# Patient Record
Sex: FEMALE | Employment: FULL TIME | URBAN - METROPOLITAN AREA
[De-identification: names, ages, dates, MRNs, and addresses within clinical notes are randomized per-mention and may not be internally consistent; named-entity substitution may affect disease eponyms.]

---

## 2020-03-12 ENCOUNTER — APPOINTMENT (OUTPATIENT)
Dept: INTERPRETER SERVICES | Facility: CLINIC | Age: 35
End: 2020-03-12
Payer: MEDICAID

## 2020-03-27 ENCOUNTER — APPOINTMENT (OUTPATIENT)
Dept: INTERPRETER SERVICES | Facility: CLINIC | Age: 35
End: 2020-03-27
Payer: MEDICAID

## 2020-03-30 ENCOUNTER — ANCILLARY ORDERS (OUTPATIENT)
Dept: OBGYN | Facility: CLINIC | Age: 35
End: 2020-03-30
Payer: MEDICAID

## 2020-03-30 ENCOUNTER — OFFICE VISIT (OUTPATIENT)
Dept: OBGYN | Facility: CLINIC | Age: 35
End: 2020-03-30
Attending: MIDWIFE
Payer: MEDICAID

## 2020-03-30 ENCOUNTER — ANCILLARY PROCEDURE (OUTPATIENT)
Dept: ULTRASOUND IMAGING | Facility: CLINIC | Age: 35
End: 2020-03-30
Attending: OBSTETRICS & GYNECOLOGY
Payer: MEDICAID

## 2020-03-30 VITALS
HEART RATE: 63 BPM | DIASTOLIC BLOOD PRESSURE: 65 MMHG | HEIGHT: 59 IN | WEIGHT: 185.3 LBS | BODY MASS INDEX: 37.36 KG/M2 | SYSTOLIC BLOOD PRESSURE: 111 MMHG

## 2020-03-30 DIAGNOSIS — E78.5 DYSLIPIDEMIA: ICD-10-CM

## 2020-03-30 DIAGNOSIS — O09.90 HIGH RISK PREGNANCY, ANTEPARTUM: ICD-10-CM

## 2020-03-30 DIAGNOSIS — O36.71X0 DELIVERY OF VIABLE FETUS DURING ABDOMINAL PREGNANCY IN FIRST TRIMESTER, SINGLE OR UNSPECIFIED FETUS: ICD-10-CM

## 2020-03-30 DIAGNOSIS — E11.29 TYPE 2 DIABETES MELLITUS WITH MICROALBUMINURIA, UNSPECIFIED WHETHER LONG TERM INSULIN USE: ICD-10-CM

## 2020-03-30 DIAGNOSIS — Z33.1 PREGNANT STATE, INCIDENTAL: ICD-10-CM

## 2020-03-30 DIAGNOSIS — Z98.891 PREVIOUS CESAREAN SECTION: ICD-10-CM

## 2020-03-30 DIAGNOSIS — O09.91 HRP (HIGH RISK PREGNANCY), FIRST TRIMESTER: Primary | ICD-10-CM

## 2020-03-30 DIAGNOSIS — R80.9 TYPE 2 DIABETES MELLITUS WITH MICROALBUMINURIA, UNSPECIFIED WHETHER LONG TERM INSULIN USE: ICD-10-CM

## 2020-03-30 DIAGNOSIS — Z23 NEED FOR IMMUNIZATION AGAINST INFLUENZA: ICD-10-CM

## 2020-03-30 PROBLEM — N92.6 IRREGULAR PERIODS: Status: ACTIVE | Noted: 2017-04-04

## 2020-03-30 LAB
ABO + RH BLD: NORMAL
ABO + RH BLD: NORMAL
BASOPHILS # BLD AUTO: 0 10E9/L (ref 0–0.2)
BASOPHILS NFR BLD AUTO: 0.2 %
BLD GP AB SCN SERPL QL: NORMAL
BLOOD BANK CMNT PATIENT-IMP: NORMAL
DIFFERENTIAL METHOD BLD: ABNORMAL
EOSINOPHIL # BLD AUTO: 0 10E9/L (ref 0–0.7)
EOSINOPHIL NFR BLD AUTO: 0.4 %
ERYTHROCYTE [DISTWIDTH] IN BLOOD BY AUTOMATED COUNT: 14.1 % (ref 10–15)
HBA1C MFR BLD: 6.2 % (ref 0–5.6)
HCT VFR BLD AUTO: 38.1 % (ref 35–47)
HGB BLD-MCNC: 12.5 G/DL (ref 11.7–15.7)
IMM GRANULOCYTES # BLD: 0 10E9/L (ref 0–0.4)
IMM GRANULOCYTES NFR BLD: 0.4 %
LYMPHOCYTES # BLD AUTO: 3.8 10E9/L (ref 0.8–5.3)
LYMPHOCYTES NFR BLD AUTO: 36.3 %
MCH RBC QN AUTO: 26.3 PG (ref 26.5–33)
MCHC RBC AUTO-ENTMCNC: 32.8 G/DL (ref 31.5–36.5)
MCV RBC AUTO: 80 FL (ref 78–100)
MONOCYTES # BLD AUTO: 0.7 10E9/L (ref 0–1.3)
MONOCYTES NFR BLD AUTO: 6.4 %
NEUTROPHILS # BLD AUTO: 6 10E9/L (ref 1.6–8.3)
NEUTROPHILS NFR BLD AUTO: 56.3 %
NRBC # BLD AUTO: 0 10*3/UL
NRBC BLD AUTO-RTO: 0 /100
PLATELET # BLD AUTO: 261 10E9/L (ref 150–450)
RBC # BLD AUTO: 4.75 10E12/L (ref 3.8–5.2)
SPECIMEN EXP DATE BLD: NORMAL
WBC # BLD AUTO: 10.6 10E9/L (ref 4–11)

## 2020-03-30 PROCEDURE — 86850 RBC ANTIBODY SCREEN: CPT | Performed by: ADVANCED PRACTICE MIDWIFE

## 2020-03-30 PROCEDURE — 86803 HEPATITIS C AB TEST: CPT | Performed by: ADVANCED PRACTICE MIDWIFE

## 2020-03-30 PROCEDURE — 86780 TREPONEMA PALLIDUM: CPT | Performed by: ADVANCED PRACTICE MIDWIFE

## 2020-03-30 PROCEDURE — 86901 BLOOD TYPING SEROLOGIC RH(D): CPT | Performed by: ADVANCED PRACTICE MIDWIFE

## 2020-03-30 PROCEDURE — 90686 IIV4 VACC NO PRSV 0.5 ML IM: CPT | Mod: ZF

## 2020-03-30 PROCEDURE — 36415 COLL VENOUS BLD VENIPUNCTURE: CPT | Performed by: ADVANCED PRACTICE MIDWIFE

## 2020-03-30 PROCEDURE — 86762 RUBELLA ANTIBODY: CPT | Performed by: ADVANCED PRACTICE MIDWIFE

## 2020-03-30 PROCEDURE — 86900 BLOOD TYPING SEROLOGIC ABO: CPT | Performed by: ADVANCED PRACTICE MIDWIFE

## 2020-03-30 PROCEDURE — 83036 HEMOGLOBIN GLYCOSYLATED A1C: CPT | Performed by: ADVANCED PRACTICE MIDWIFE

## 2020-03-30 PROCEDURE — G0499 HEPB SCREEN HIGH RISK INDIV: HCPCS | Performed by: ADVANCED PRACTICE MIDWIFE

## 2020-03-30 PROCEDURE — 86706 HEP B SURFACE ANTIBODY: CPT | Performed by: ADVANCED PRACTICE MIDWIFE

## 2020-03-30 PROCEDURE — 87389 HIV-1 AG W/HIV-1&-2 AB AG IA: CPT | Performed by: ADVANCED PRACTICE MIDWIFE

## 2020-03-30 PROCEDURE — 85025 COMPLETE CBC W/AUTO DIFF WBC: CPT | Performed by: ADVANCED PRACTICE MIDWIFE

## 2020-03-30 PROCEDURE — G0463 HOSPITAL OUTPT CLINIC VISIT: HCPCS | Mod: 25,ZF

## 2020-03-30 PROCEDURE — 76817 TRANSVAGINAL US OBSTETRIC: CPT

## 2020-03-30 PROCEDURE — 82306 VITAMIN D 25 HYDROXY: CPT | Performed by: ADVANCED PRACTICE MIDWIFE

## 2020-03-30 PROCEDURE — 87086 URINE CULTURE/COLONY COUNT: CPT | Performed by: ADVANCED PRACTICE MIDWIFE

## 2020-03-30 PROCEDURE — G0008 ADMIN INFLUENZA VIRUS VAC: HCPCS | Mod: ZF

## 2020-03-30 PROCEDURE — T1013 SIGN LANG/ORAL INTERPRETER: HCPCS | Mod: U3,ZF | Performed by: ADVANCED PRACTICE MIDWIFE

## 2020-03-30 PROCEDURE — 25000128 H RX IP 250 OP 636: Mod: ZF

## 2020-03-30 RX ORDER — GEMFIBROZIL 600 MG/1
600 TABLET, FILM COATED ORAL DAILY
COMMUNITY
Start: 2019-12-23 | End: 2020-04-10

## 2020-03-30 RX ORDER — PNV NO.95/FERROUS FUM/FOLIC AC 28MG-0.8MG
1 TABLET ORAL DAILY
COMMUNITY
End: 2020-08-26

## 2020-03-30 ASSESSMENT — PATIENT HEALTH QUESTIONNAIRE - PHQ9
5. POOR APPETITE OR OVEREATING: NOT AT ALL
SUM OF ALL RESPONSES TO PHQ QUESTIONS 1-9: 2

## 2020-03-30 ASSESSMENT — MIFFLIN-ST. JEOR: SCORE: 1451.15

## 2020-03-30 ASSESSMENT — PAIN SCALES - GENERAL: PAINLEVEL: NO PAIN (0)

## 2020-03-30 ASSESSMENT — ANXIETY QUESTIONNAIRES
GAD7 TOTAL SCORE: 1
7. FEELING AFRAID AS IF SOMETHING AWFUL MIGHT HAPPEN: NOT AT ALL
3. WORRYING TOO MUCH ABOUT DIFFERENT THINGS: NOT AT ALL
6. BECOMING EASILY ANNOYED OR IRRITABLE: SEVERAL DAYS
5. BEING SO RESTLESS THAT IT IS HARD TO SIT STILL: NOT AT ALL
1. FEELING NERVOUS, ANXIOUS, OR ON EDGE: NOT AT ALL
2. NOT BEING ABLE TO STOP OR CONTROL WORRYING: NOT AT ALL

## 2020-03-30 NOTE — PROGRESS NOTES
[facility] OB Intake note  Subjective   34 year old woman presents to clinic for initiation of OB care. Patient's last menstrual period was 2020.  at 7w3d by Estimated Date of Delivery: 2020 based on US confirms. Reviewed dating ultrasound. Pregnancy is unplanned but welcomed.      Symptoms since LMP include nausea. Patient has tried these relief measures: diet modification.    - Genetic/Infection questionnaire completed, risks include: Type 2 DM. Pt  does not have a recent known exposure to Parvo or CMV so IgG/IgM testing WILL NOT be ordered.   Have you traveled during the pregnancy?No  Have your sexual partner(s) travelled during the pregnancy?No      - Current Medications    Current Outpatient Medications   Medication Sig Dispense Refill     metFORMIN (GLUCOPHAGE) 1000 MG tablet Take 1,000 mg by mouth 2 times daily (with meals)       Prenatal Vit-Fe Fumarate-FA (PRENATAL VITAMIN) 27-0.8 MG TABS Take 1 tablet by mouth daily                   - Co-morbids  BMI 37, PCOS, Type 2 DM, Previous  section       - High Risk for Pre E-  Has Pre Gestational Diabetes (Type 1 or Type 2) so WILL start low dose aspirin (81mg) starting between 12 and 28 weeks to prevent early onset preeclampsia.        - The patient  does not have a history of spontaneous  birth so  WILL NOT consider progesterone starting at 16-20 weeks and/or serial transvaginal cervical length ultrasounds from 16-24 weeks.     -The patient does not have a history of immunosuppresion or HIV so Toxoplasma IgG/IgM WILL NOT be ordered.     PERSONAL/SOCIAL HISTORY    lives with their family.  Employment: Full time. 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    Diagnoses and all orders for this visit:    HRP (high risk pregnancy), first trimester  -     Hemoglobin A1c    Pregnant state, incidental  -     25- OH-Vitamin D  -     ABO/Rh Type and Screen  -     CBC with Platelets Differential  -     Hepatitis B Surface Antigen  -     Urine Culture Aerobic Bacterial  -     Treponema Abs w Reflex to RPR and Titer  -     Rubella Antibody IgG Quantitative  -     Cancel: Routine UA with Microscopic  -     HIV Antigen Antibody Combo  -     Hepatitis C antibody  -     Hepatitis B Surface Antibody    Type 2 diabetes mellitus with microalbuminuria, unspecified whether long term insulin use (H)  -     ENDOCRINOLOGY ADULT REFERRAL    Need for immunization against influenza  -     FLU VAC PRESRV FREE QUAD SPLIT VIR 3+YRS IM    Previous  section    Dyslipidemia        34 year old  7w3d weeks of pregnancy with VIKAS of 2020 by LMP of Patient's last menstrual period was 2020.. Ultrasound confirms.   Outpatient Encounter Medications as of 3/30/2020   Medication Sig Dispense Refill     metFORMIN (GLUCOPHAGE) 1000 MG tablet Take 1,000 mg by mouth 2 times daily (with meals)       Prenatal Vit-Fe Fumarate-FA (PRENATAL VITAMIN) 27-0.8 MG TABS Take 1 tablet by mouth daily              No facility-administered encounter medications on file as of 3/30/2020.       Orders Placed This Encounter   Procedures     FLU VAC PRESRV FREE QUAD SPLIT VIR 3+YRS IM     25- OH-Vitamin D     CBC with Platelets Differential     Hepatitis B Surface Antigen     Treponema Abs w Reflex to RPR and Titer     Rubella Antibody IgG Quantitative     HIV Antigen Antibody Combo     Hepatitis C antibody     Hepatitis B Surface Antibody     Hemoglobin A1c     ENDOCRINOLOGY ADULT REFERRAL     ABO/Rh Type and Screen                       - Oriented to Practice, types of care, and how to reach a  provider.  Pt prefers MD team  - Patient received 1st trimester new OB education packet complete with aide of [employer] booklet including information on genetic screening test options.  - Patient would like to disucss options further at new OB visit.  - Educational handout on the prevention of infections diseases during pregnancy provided.  - Patient was encouraged to start prenatal vitamins as tolerated.    - Patient was sent to lab for routine OB labs including- Hgb A1c.   - Reviewed recommendation for low dose aspirin daily to prevent pre eclampsia, pt agrees, follow up at NOB visit.   - Pregnancy concerns to be addressed by provider at new OB exam include: previous C/S x1 and Diabetes.    Pt to RTO for NOB visit in 4 weeks and prn if questions or concerns    DENNY Chacko CNM

## 2020-03-30 NOTE — LETTER
3/30/2020       RE: Tanisha Valenzuela  [address] [facility] [address]     Dear Colleague,    Thank you for referring your patient, Tanisha Valenzuela, to the [facility] at [facility]. Please see a copy of my visit note below.    [facility] OB Intake note  Subjective   34 year old woman presents to clinic for initiation of OB care. Patient's last menstrual period was 2020.  at 7w3d by Estimated Date of Delivery: 2020 based on US confirms. Reviewed dating ultrasound. Pregnancy is unplanned but welcomed.      Symptoms since LMP include nausea. Patient has tried these relief measures: diet modification.    - Genetic/Infection questionnaire completed, risks include: Type 2 DM. Pt  does not have a recent known exposure to Parvo or CMV so IgG/IgM testing WILL NOT be ordered.   Have you traveled during the pregnancy?No  Have your sexual partner(s) travelled during the pregnancy?No      - Current Medications    Current Outpatient Medications   Medication Sig Dispense Refill     metFORMIN (GLUCOPHAGE) 1000 MG tablet Take 1,000 mg by mouth 2 times daily (with meals)       Prenatal Vit-Fe Fumarate-FA (PRENATAL VITAMIN) 27-0.8 MG TABS Take 1 tablet by mouth daily                   - Co-morbids  BMI 37, PCOS, Type 2 DM, Previous  section       - High Risk for Pre E-  Has Pre Gestational Diabetes (Type 1 or Type 2) so WILL start low dose aspirin (81mg) starting between 12 and 28 weeks to prevent early onset preeclampsia.        - The patient  does not have a history of spontaneous  birth so  WILL NOT consider progesterone starting at 16-20 weeks and/or serial transvaginal cervical length ultrasounds from 16-24 weeks.     -The patient does not have a history of immunosuppresion or HIV so Toxoplasma IgG/IgM WILL NOT be ordered.     PERSONAL/SOCIAL HISTORY    lives with their family.  Employment: Full time. 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    Diagnoses and all orders for this visit:    HRP (high risk pregnancy), first trimester  -     Hemoglobin A1c    Pregnant state, incidental  -     25- OH-Vitamin D  -     ABO/Rh Type and Screen  -     CBC with Platelets Differential  -     Hepatitis B Surface Antigen  -     Urine Culture Aerobic Bacterial  -     Treponema Abs w Reflex to RPR and Titer  -     Rubella Antibody IgG Quantitative  -     Cancel: Routine UA with Microscopic  -     HIV Antigen Antibody Combo  -     Hepatitis C antibody  -     Hepatitis B Surface Antibody    Type 2 diabetes mellitus with microalbuminuria, unspecified whether long term insulin use (H)  -     ENDOCRINOLOGY ADULT REFERRAL    Need for immunization against influenza  -     FLU VAC PRESRV FREE QUAD SPLIT VIR 3+YRS IM    Previous  section    Dyslipidemia        34 year old  7w3d weeks of pregnancy with VIKAS of 2020 by LMP of Patient's last menstrual period was 2020.. Ultrasound confirms.   Outpatient Encounter Medications as of 3/30/2020   Medication Sig Dispense Refill     metFORMIN (GLUCOPHAGE) 1000 MG tablet Take 1,000 mg by mouth 2 times daily (with meals)       Prenatal Vit-Fe Fumarate-FA (PRENATAL VITAMIN) 27-0.8 MG TABS Take 1 tablet by mouth daily              No facility-administered encounter medications on file as of 3/30/2020.       Orders Placed This Encounter   Procedures     FLU VAC PRESRV FREE QUAD SPLIT VIR 3+YRS IM     25- OH-Vitamin D     CBC with Platelets Differential     Hepatitis B Surface Antigen     Treponema Abs w Reflex to RPR  and Titer     Rubella Antibody IgG Quantitative     HIV Antigen Antibody Combo     Hepatitis C antibody     Hepatitis B Surface Antibody     Hemoglobin A1c     ENDOCRINOLOGY ADULT REFERRAL     ABO/Rh Type and Screen                       - Oriented to Practice, types of care, and how to reach a provider.  Pt prefers MD team  - Patient received 1st trimester new OB education packet complete with aide of [employer] booklet including information on genetic screening test options.  - Patient would like to disucss options further at new OB visit.  - Educational handout on the prevention of infections diseases during pregnancy provided.  - Patient was encouraged to start prenatal vitamins as tolerated.    - Patient was sent to lab for routine OB labs including- Hgb A1c.   - Reviewed recommendation for low dose aspirin daily to prevent pre eclampsia, pt agrees, follow up at NOB visit.   - Pregnancy concerns to be addressed by provider at new OB exam include: previous C/S x1 and Diabetes.    Pt to RTO for NOB visit in 4 weeks and prn if questions or concerns    DENNY Chacko CNM

## 2020-03-31 ENCOUNTER — TELEPHONE (OUTPATIENT)
Dept: ENDOCRINOLOGY | Facility: CLINIC | Age: 35
End: 2020-03-31

## 2020-03-31 LAB
BACTERIA SPEC CULT: NORMAL
DEPRECATED CALCIDIOL+CALCIFEROL SERPL-MC: 19 UG/L (ref 20–75)
HBV SURFACE AB SERPL IA-ACNC: 192.38 M[IU]/ML
HBV SURFACE AG SERPL QL IA: NONREACTIVE
HCV AB SERPL QL IA: NONREACTIVE
HIV 1+2 AB+HIV1 P24 AG SERPL QL IA: NONREACTIVE
Lab: NORMAL
RUBV IGG SERPL IA-ACNC: 82 IU/ML
SPECIMEN SOURCE: NORMAL
T PALLIDUM AB SER QL: NONREACTIVE

## 2020-03-31 ASSESSMENT — ANXIETY QUESTIONNAIRES: GAD7 TOTAL SCORE: 1

## 2020-03-31 NOTE — TELEPHONE ENCOUNTER
Please schedule telephone/ video visit for GDM in any 1 hour space this week. On call if not available

## 2020-03-31 NOTE — TELEPHONE ENCOUNTER
[facility] Call Center    Phone Message    May a detailed message be left on voicemail: yes     Reason for Call: Other: Patient is being referred for Type 2 Diabetes, she has  Pre Gestational Diabetes. Please review and call patient to schedule.      Action Taken: Other: Diabetes and Endocrinology    Travel Screening: Not Applicable

## 2020-04-03 DIAGNOSIS — R79.89 LOW VITAMIN D LEVEL: Primary | ICD-10-CM

## 2020-04-03 RX ORDER — CHOLECALCIFEROL (VITAMIN D3) 50 MCG
2 TABLET ORAL DAILY
Qty: 180 TABLET | Refills: 3 | Status: SHIPPED | OUTPATIENT
Start: 2020-04-03

## 2020-04-08 NOTE — TELEPHONE ENCOUNTER
RECORDS RECEIVED FROM: Internal/Care Everywhere   DATE RECEIVED: 4-10-20   NOTES (FOR ALL VISITS) STATUS DETAILS   OFFICE NOTES from referring provider Internal 3-30-20 Theresa Hernandez CNM   OFFICE NOTES from other specialist Care Everywhere Had DM2 followed by GP   12-23-19 9-20-19 9-6-19   ED NOTES N/A    OPERATIVE REPORT  (thyroid, pituitary, adrenal, parathyroid) N/A    MEDICATION LIST Internal    IMAGING      DEXASCAN N/A    MRI (BRAIN) N/A    XR (Chest) N/A    CT (HEAD/NECK/CHEST/ABDOMEN) N/A    NUCLEAR  N/A    ULTRASOUND (HEAD/NECK) N/A    LABS     DIABETES: HBGA1C, CREATININE, FASTING LIPIDS, MICROALBUMIN URINE, POTASSIUM, TSH, T4    THYROID: TSH, T4, CBC, THYRODLONULIN, TOTAL T3, FREE T4, CALCITONIN, CEA Internal   3-30-20

## 2020-04-09 ENCOUNTER — DOCUMENTATION ONLY (OUTPATIENT)
Dept: CARE COORDINATION | Facility: CLINIC | Age: 35
End: 2020-04-09

## 2020-04-09 NOTE — PROGRESS NOTES
"Tanisha Valenzuela is a 34 year old female who is being evaluated via a billable telephone visit.      The patient has been notified of following:     \"This telephone visit will be conducted via a call between you and your physician/provider. We have found that certain health care needs can be provided without the need for a physical exam.  This service lets us provide the care you need with a short phone conversation.  If a prescription is necessary we can send it directly to your pharmacy.  If lab work is needed we can place an order for that and you can then stop by our lab to have the test done at a later time.    Telephone visits are billed at different rates depending on your insurance coverage. During this emergency period, for some insurers they may be billed the same as an in-person visit.  Please reach out to your insurance provider with any questions.    If during the course of the call the physician/provider feels a telephone visit is not appropriate, you will not be charged for this service.\"    Patient has given verbal consent for Telephone visit?  Yes    How would you like to obtain your AVS? Mail a copy    Tanisha Valenzuela complains of    Chief Complaint   Patient presents with     New Patient     GDM       I have reviewed and updated the patient's Past Medical History, Social History, Family History and Medication List.    SPOKE TO PT 4/9 @12:12PM PT DOES NOT CHECK GLUCOSE LAST TIME SHE CHECKED WAS IN December 2019.-JM  "

## 2020-04-10 ENCOUNTER — VIRTUAL VISIT (OUTPATIENT)
Dept: ENDOCRINOLOGY | Facility: CLINIC | Age: 35
End: 2020-04-10
Payer: MEDICAID

## 2020-04-10 ENCOUNTER — APPOINTMENT (OUTPATIENT)
Dept: INTERPRETER SERVICES | Facility: CLINIC | Age: 35
End: 2020-04-10
Payer: MEDICAID

## 2020-04-10 ENCOUNTER — PRE VISIT (OUTPATIENT)
Dept: ENDOCRINOLOGY | Facility: CLINIC | Age: 35
End: 2020-04-10

## 2020-04-10 DIAGNOSIS — E11.29 TYPE 2 DIABETES MELLITUS WITH MICROALBUMINURIA, UNSPECIFIED WHETHER LONG TERM INSULIN USE: Primary | ICD-10-CM

## 2020-04-10 DIAGNOSIS — R80.9 TYPE 2 DIABETES MELLITUS WITH MICROALBUMINURIA, UNSPECIFIED WHETHER LONG TERM INSULIN USE: Primary | ICD-10-CM

## 2020-04-10 NOTE — PATIENT INSTRUCTIONS
Patient to check her blood sugar before meals, and after meals. Goal blood sugar 60-90 fasting, one hour postprandial less than 140, two-hour postprandial less than 120, generally after eating should be less than 130    Less carb intake (rice/tortillas)

## 2020-04-10 NOTE — PROGRESS NOTES
Due to the COVID 19 pandemic this visit was converted to a telephone/virtual  visit in order to help prevent spread of infection in this high risk patient and the general population .      Start time: 1230, stop time: 110: Total time, 40 minutes.   required.    Tanisha Valenzuela is a 34 year old female who is being evaluated via a billable telephone visit.      HPI  #1 type 2 diabetes  Per patient report, she is had diabetes since approximately 2017.  She has been on metformin since diagnosis.  She has been very intermittent with checking her blood sugar.  September 2019 hemoglobin A1c of 7.1 in March 2020 hemoglobin A1c of 6.2.  Currently she is on metformin at 1000 mg twice daily.    Per patient report, she just recently received a meter and understands she that she has to check checked before eating and after meals every day.  She had previously been on gemfibrozil but this was stopped in January 2020.  She has never been on insulin.  There is an extremely strong family history of diabetes.  She has not met with diabetes education.      #2 pregnancy  Patient is currently pregnant.  She is approximately 7 weeks.  She anticipates a due date in November 2020. She reports a previous pregnancy (around 2004) for which she did not have gestational diabetes.      Past Medical History  Past Medical History:   Diagnosis Date     BMI 37.0-37.9, adult      Hyperlipidemia      PCOS (polycystic ovarian syndrome)      Type 2 diabetes mellitus without complication, with long-term current use of insulin (H)        Allergies  No Known Allergies  Medications  Current Outpatient Medications   Medication Sig Dispense Refill     metFORMIN (GLUCOPHAGE) 1000 MG tablet Take 1,000 mg by mouth 2 times daily (with meals)       Prenatal Vit-Fe Fumarate-FA (PRENATAL VITAMIN) 27-0.8 MG TABS Take 1 tablet by mouth daily       vitamin D3 (CHOLECALCIFEROL) 2000 units (50 mcg) tablet Take 2 tablets (4,000 Units) by mouth daily Take  two tablets daily. 180 tablet 3     gemfibrozil (LOPID) 600 MG tablet Take 600 mg by mouth daily       Family History  family history includes Diabetes in her maternal grandmother and mother.  Social History  Social History     Socioeconomic History     Marital status:      Spouse name: Zander Gil     Number of children: 1     Years of education: Not on file     Highest education level: Not on file   Occupational History     Not on file   Social Needs     Financial resource strain: Not on file     Food insecurity     Worry: Not on file     Inability: Not on file     Transportation needs     Medical: Not on file     Non-medical: Not on file   Tobacco Use     Smoking status: Former Smoker     Smokeless tobacco: Never Used   Substance and Sexual Activity     Alcohol use: Not Currently     Drug use: Never     Sexual activity: Yes     Partners: Male   Lifestyle     Physical activity     Days per week: Not on file     Minutes per session: Not on file     Stress: Not on file   Relationships     Social connections     Talks on phone: Not on file     Gets together: Not on file     Attends [employer] service: Not on file     Active member of club or organization: Not on file     Attends meetings of clubs or organizations: Not on file     Relationship status: Not on file     Intimate partner violence     Fear of current or ex partner: Not on file     Emotionally abused: Not on file     Physically abused: Not on file     Forced sexual activity: Not on file   Other Topics Concern     Not on file   Social History Narrative    How much exercise per week? Daily activites    How much calcium per day? In prenatals        How much caffeine per day? Not every day  23 week    How much vitamin D per day? In prentals    Do you/your family wear seatbelts?  Yes    Do you/your family use safety helmets? Yes    Do you/your family use sunscreen? Yes    Do you/your family keep firearms in the home? No    Do you/your family have a smoke  detector(s)? Yes        Reviewed [facility]-           ROS  Per HPI, otherwise review of symptoms was unremarkable      Physical Exam  LMP 02/01/2020   There is no height or weight on file to calculate BMI.    Vital signs and physical exam not available.  However the patient reports feeling well.  Psych: Alert and oriented times 3; coherent speech, normal   rate and volume, able to articulate logical thoughts, able   to abstract reason, no tangential thoughts, no hallucinations   or delusions        RESULTS  Office Visit on 03/30/2020   Component Date Value Ref Range Status     Vitamin D Deficiency screening 03/30/2020 19* 20 - 75 ug/L Final    Comment: Season, race, dietary intake, and treatment affect the concentration of   25-hydroxy-Vitamin D. Values may decrease during winter months and increase   during summer months. Values 20-29 ug/L may indicate Vitamin D insufficiency   and values <20 ug/L may indicate Vitamin D deficiency.  Vitamin D determination is routinely performed by an immunoassay specific for   25 hydroxyvitamin D3.  If an individual is on vitamin D2 (ergocalciferol)   supplementation, please specify 25 OH vitamin D2 and D3 level determination by   LCMSMS test [id number].       ABO 03/30/2020 A   Final     RH(D) 03/30/2020 Pos   Final     Antibody Screen 03/30/2020 Neg   Final     Test Valid Only At 03/30/2020 [facility]      Final     Specimen Expires 03/30/2020 04/02/2020   Final     WBC 03/30/2020 10.6  4.0 - 11.0 10e9/L Final     RBC Count 03/30/2020 4.75  3.8 - 5.2 10e12/L Final     Hemoglobin 03/30/2020 12.5  11.7 - 15.7 g/dL Final     Hematocrit 03/30/2020 38.1  35.0 - 47.0 % Final     MCV 03/30/2020 80  78 - 100 fl Final     MCH 03/30/2020 26.3* 26.5 - 33.0 pg Final     MCHC 03/30/2020 32.8  31.5 - 36.5 g/dL Final     RDW 03/30/2020 14.1  10.0 - 15.0 % Final     Platelet Count 03/30/2020 261  150 - 450 10e9/L Final     Diff Method  03/30/2020 Automated Method   Final     % Neutrophils 03/30/2020 56.3  % Final     % Lymphocytes 03/30/2020 36.3  % Final     % Monocytes 03/30/2020 6.4  % Final     % Eosinophils 03/30/2020 0.4  % Final     % Basophils 03/30/2020 0.2  % Final     % Immature Granulocytes 03/30/2020 0.4  % Final     Nucleated RBCs 03/30/2020 0  0 /100 Final     Absolute Neutrophil 03/30/2020 6.0  1.6 - 8.3 10e9/L Final     Absolute Lymphocytes 03/30/2020 3.8  0.8 - 5.3 10e9/L Final     Absolute Monocytes 03/30/2020 0.7  0.0 - 1.3 10e9/L Final     Absolute Eosinophils 03/30/2020 0.0  0.0 - 0.7 10e9/L Final     Absolute Basophils 03/30/2020 0.0  0.0 - 0.2 10e9/L Final     Abs Immature Granulocytes 03/30/2020 0.0  0 - 0.4 10e9/L Final     Absolute Nucleated RBC 03/30/2020 0.0   Final     Hep B Surface Agn 03/30/2020 Nonreactive  NR^Nonreactive Final     Specimen Description 03/30/2020 Midstream Urine   Final     Special Requests 03/30/2020 Specimen received in preservative   Final     Culture Micro 03/30/2020    Final                    Value:10,000 to 50,000 colonies/mL  mixed urogenital berenice  Susceptibility testing not routinely done       Treponema Antibodies 03/30/2020 Nonreactive  NR^Nonreactive Final    Comment: Methodology Change: Test performed on the [employer] Liaison XL by Treponema   pallidum Total Antibodies Assay as of 3.17.2020.       Rubella Antibody IgG Quantitative 03/30/2020 82  IU/mL Final    Comment: Positive.  Suggests previous exposure or immunization and probable immunity  Reference Range:    Unvaccinated Negative 0-7 IU/mL  Vaccinated or previous exposure Positive 10 IU/ml or greater       HIV Antigen Antibody Combo 03/30/2020 Nonreactive  NR^Nonreactive     Final    HIV-1 p24 Ag & HIV-1/HIV-2 Ab Not Detected     Hepatitis C Antibody 03/30/2020 Nonreactive  NR^Nonreactive Final    Comment: Assay performance characteristics have not been established for newborns,   infants, and children       Hepatitis B Surface  Antibody 03/30/2020 192.38* <8.00 m[IU]/mL Final    Comment: Reactive, Patient is considered to be immune to infection with hepatitis B   when the value is greater than or equal to 12.0 m[IU]/mL.       Hemoglobin A1C 03/30/2020 6.2* 0 - 5.6 % Final    Comment: Normal <5.7% Prediabetes 5.7-6.4%  Diabetes 6.5% or higher - adopted from ADA   consensus guidelines.       ASSESSMENT:    #1 Type 2 diabetes  Extensive discussion with patient.  She had pre-existing diabetes and then became pregnant.  Currently on metformin at 1000 milligrams twice daily.  I talked with her about the possibility of either continuing with the Metformin or considering insulin and tapering off the metformin.  I discussed with the patient that metformin was probably safe in pregnancy whereas insulin has an established safety record in pregnancy.  The patient verbalized that she would like to continue with metformin at this time despite the established safety record with insulin.    I discussed with patient and her goal blood sugars with pregnancy which include fasting blood sugar roughly 60-90 and postprandial blood sugar less than 130.  She will check 4 times per day.  If she is unable to comply with this program, I may consider a CGM for the patient.  The patient understands that if her blood sugars are not within these goals, that she will be started on insulin and that she will need diabetes education at that time.  The patient will work on reducing her carbohydrate intake in the interim.    The patient has deferred on dietitian referral at this time.  She may need diabetes education in the future if she requires insulin for treatment of her blood sugars.    The patient also understands the importance of close follow-up.  We will call the patient in 1 week as a telephone visit to monitor her blood sugar.  She is very particular about the times of contact (either at noon or after 330) and will need a  at that time.  The  "patient understands that she needs to check her blood sugar which we will follow-up at that time and that over pregnancy, her glycemic control may worsen and that insulin may be a distinct possibility.  However again the patient verbalized understanding and would like to remain on metformin at this time.  Given that her recent hemoglobin A1c was 6.2 in March 2020, this is reassuring.    #2 pregnancy  Continue with OB follow-up.    #3 history of vitamin D deficiency  Patient currently on vitamin D at 4000 IU daily.    Start time: 1230, stop time: 110: Total time, 40 minutes.   required.    The patient has been notified of following:     \"This telephone visit will be conducted via a call between you and your physician/provider. We have found that certain health care needs can be provided without the need for a physical exam.  This service lets us provide the care you need with a short phone conversation.  If a prescription is necessary we can send it directly to your pharmacy.  If lab work is needed we can place an order for that and you can then stop by our lab to have the test done at a later time.    Telephone visits are billed at different rates depending on your insurance coverage. During this emergency period, for some insurers they may be billed the same as an in-person visit.  Please reach out to your insurance provider with any questions.    If during the course of the call the physician/provider feels a telephone visit is not appropriate, you will not be charged for this service.\"    Patient has given verbal consent for Telephone visit? Yes    "

## 2020-04-20 ENCOUNTER — APPOINTMENT (OUTPATIENT)
Dept: INTERPRETER SERVICES | Facility: CLINIC | Age: 35
End: 2020-04-20
Payer: MEDICAID

## 2020-04-20 NOTE — PROGRESS NOTES
"Tanisha Valenzuela is a 34 year old female who is being evaluated via a billable video visit.      The patient has been notified of following:     \"This video visit will be conducted via a call between you and your physician/provider. We have found that certain health care needs can be provided without the need for an in-person physical exam.  This service lets us provide the care you need with a video conversation.  If a prescription is necessary we can send it directly to your pharmacy.  If lab work is needed we can place an order for that and you can then stop by our lab to have the test done at a later time.    Video visits are billed at different rates depending on your insurance coverage.  Please reach out to your insurance provider with any questions.    If during the course of the call the physician/provider feels a video visit is not appropriate, you will not be charged for this service.\"    Patient has given verbal consent for Video visit? Yes    How would you like to obtain your AVS? Mail a copy    Patient would like the video invitation sent by: Text to cell phone: [phone]      Video Start Time: 4:50      Video-Visit Details    Type of service:  Video Visit    Video End Time (time video stopped):5:20    Originating Location (pt. Location): Home    Distant Location (provider location):  [facility]     Mode of Communication:  Doximity      Due to the COVID 19 pandemic this visit was converted to a telephone/virtual  visit in order to help prevent spread of infection in this high risk patient and the general population .       Total time, 30 minutes.     Tanisha Valenzuela is a 34 year old female who is being evaluated via a billable telephone visit.          HPI  #1 type 2 diabetes  Per patient report, she is had diabetes since approximately 2017.  She has been on metformin since diagnosis.  She has been very intermittent with checking her blood sugar.  September 2019 hemoglobin A1c " of 7.1 in March 2020 hemoglobin A1c of 6.2.  Currently she is on metformin at 1000 mg twice daily.    Per patient report, she just recently received a meter and understands she that she has to check checked before eating and after meals every day.  She had previously been on gemfibrozil but this was stopped in January 2020.  She has never been on insulin.  There is an strong family history of diabetes.  She has not met with diabetes education since pregnant but did to learn glucometer use.      #2 pregnancy  Patient is currently pregnant.  She is approximately 10 weeks.  She anticipates a due date in November 2020. She reports a previous pregnancy (around 2004) for which she did not have gestational diabetes.    She reports that she feels well.  She is avoiding sugar beverages, eating more fruits and vegetables.  Less rice and tortilla.  No bread.   She is avoiding eating in the evening after dinner.   She is taking Metformin.  Some nausea and eating little in the mornings.    She continues working and active there.  Walking most days as well.      She is taking Vitamin D and PNV.      Glucometer data:  106 today after lunch  98 fasting this morning.         Past Medical History  Past Medical History:   Diagnosis Date     BMI 37.0-37.9, adult      Hyperlipidemia      PCOS (polycystic ovarian syndrome)      Type 2 diabetes mellitus without complication, with long-term current use of insulin (H)        Allergies  No Known Allergies  Medications  Current Outpatient Medications   Medication Sig Dispense Refill     metFORMIN (GLUCOPHAGE) 1000 MG tablet Take 1 tablet (1,000 mg) by mouth 2 times daily (with meals) 90 tablet 3     Prenatal Vit-Fe Fumarate-FA (PRENATAL VITAMIN) 27-0.8 MG TABS Take 1 tablet by mouth daily       vitamin D3 (CHOLECALCIFEROL) 2000 units (50 mcg) tablet Take 2 tablets (4,000 Units) by mouth daily Take two tablets daily. 180 tablet 3     Family History  family history includes Diabetes in her  "maternal grandmother and mother.  Social History    ROS  Per HPI, otherwise review of symptoms was unremarkable      Physical Exam    PHONE VISIT    Tanisha is alerted and oriented.  Mood is \"good,\" affect is congruent.  Thoughtful form and content are fluid and coherent.  No signs of distress are appreciated.      Wt Readings from Last 10 Encounters:   20 84.1 kg (185 lb 4.8 oz)               RESULTS  Office Visit on 2020   Component Date Value Ref Range Status     Vitamin D Deficiency screening 2020 19* 20 - 75 ug/L Final    Comment: Season, race, dietary intake, and treatment affect the concentration of   25-hydroxy-Vitamin D. Values may decrease during winter months and increase   during summer months. Values 20-29 ug/L may indicate Vitamin D insufficiency   and values <20 ug/L may indicate Vitamin D deficiency.  Vitamin D determination is routinely performed by an immunoassay specific for   25 hydroxyvitamin D3.  If an individual is on vitamin D2 (ergocalciferol)   supplementation, please specify 25 OH vitamin D2 and D3 level determination by   LCMSMS test [id number].       Lab Results   Component Value Date    A1C 6.2 2020       ASSESSMENT:    #1 Type 2 diabetes  33 yo F with   Estimated Date of Delivery: 2020 based on US.  Pregnancy is unplanned but welcomed.  Recent hemoglobin A1c was 6.2 in 2020, is reassuring.  She is trying to be careful with diet, but hard time checking and recalling BG.  Explained why important and timing.  She is agreeable to meet with nutrition and CDE.  Understands may need to start insulin, but will work on diet.  Will check BG fasting and 1 hour after each meal and record for review in 1 week.    #2 pregnancy  Continue with OB follow-up.  Taking PNV daily    #3 history of vitamin D deficiency  Patient currently on vitamin D at 4000 IU daily.      Hannah Patel PA-C, MPAS  [facility]  Diabetes, Endocrinology, and " Metabolism  [phone] Appointments/Nurse  [phone] Fax  [phone] nurse line  [phone] URGENTafter hours/weekend Endocrinologist on call

## 2020-04-21 ENCOUNTER — VIRTUAL VISIT (OUTPATIENT)
Dept: ENDOCRINOLOGY | Facility: CLINIC | Age: 35
End: 2020-04-21
Payer: MEDICAID

## 2020-04-21 DIAGNOSIS — E11.29 TYPE 2 DIABETES MELLITUS WITH MICROALBUMINURIA, UNSPECIFIED WHETHER LONG TERM INSULIN USE: Primary | ICD-10-CM

## 2020-04-21 DIAGNOSIS — O09.90 HIGH RISK PREGNANCY, ANTEPARTUM: ICD-10-CM

## 2020-04-21 DIAGNOSIS — R80.9 TYPE 2 DIABETES MELLITUS WITH MICROALBUMINURIA, UNSPECIFIED WHETHER LONG TERM INSULIN USE: Primary | ICD-10-CM

## 2020-04-27 RX ORDER — FLASH GLUCOSE SENSOR
KIT MISCELLANEOUS
Qty: 2 EACH | Refills: 11 | Status: SHIPPED | OUTPATIENT
Start: 2020-04-27 | End: 2020-08-26

## 2020-04-27 NOTE — PATIENT INSTRUCTIONS
Pino!    Fue un gusto conversar con Ud.!    Por favor, sigue chequendo barrera gluocsa cada nereida en ayunas y 1 o 2 horas despues de cada comida.    Para la arlet de barrera peyton es importane mantener la gluocsa en un rango mas estructa que normalmente.    Nuestra meta en el embarazo:  En ayunas: 80 - 95.     1 hora despues de comer: <140  O  2 horas despues de comer: <120    Queremos hacer eso sin ariesgarse a Ud. Y por eso siempre mantener barrera azucar >70.    Trabajaremos juntos lograr eso.  LLame o My Chart cada vez que el azucar es mas que la meta mas que 2 veces en ken semana o menos 70.       Mis mejores deseos,    My best wishes,    Hannah Patel PA-C, MPAS  [facility]  Diabetes, Endocrinology, and Metabolism  [phone] Appointments/Nurse - [place]  [phone] Fax  [phone] URGENTafter hours/weekend Endocrinologist on call

## 2020-04-28 ENCOUNTER — VIRTUAL VISIT (OUTPATIENT)
Dept: ENDOCRINOLOGY | Facility: CLINIC | Age: 35
End: 2020-04-28
Payer: MEDICAID

## 2020-04-28 DIAGNOSIS — O09.90 HIGH RISK PREGNANCY, ANTEPARTUM: ICD-10-CM

## 2020-04-28 DIAGNOSIS — O24.119 TYPE 2 DIABETES MELLITUS AFFECTING PREGNANCY, ANTEPARTUM: Primary | ICD-10-CM

## 2020-04-28 DIAGNOSIS — E66.09 OBESITY DUE TO EXCESS CALORIES WITH SERIOUS COMORBIDITY, UNSPECIFIED CLASSIFICATION: ICD-10-CM

## 2020-04-28 NOTE — PROGRESS NOTES
"Tanisha Valenzuela is a 34 year old female who is being evaluated via a billable telephone visit.      The patient has been notified of following:     \"This telephone visit will be conducted via a call between you and your physician/provider. We have found that certain health care needs can be provided without the need for a physical exam.  This service lets us provide the care you need with a short phone conversation.  If a prescription is necessary we can send it directly to your pharmacy.  If lab work is needed we can place an order for that and you can then stop by our lab to have the test done at a later time.    Telephone visits are billed at different rates depending on your insurance coverage. During this emergency period, for some insurers they may be billed the same as an in-person visit.  Please reach out to your insurance provider with any questions.    If during the course of the call the physician/provider feels a telephone visit is not appropriate, you will not be charged for this service.\"    Patient has given verbal consent for Video visit?  Yes        Did not join video call;  phone call via INNOBI 5:10 - 5:42      Tanisha Valenzuela is a 34 year old female who is being evaluated via a billable video visit.          Virtual Visit  Hannah Patel PA-C  5/1/2020      HPI  #1 type 2 diabetes  Per patient report, she is had diabetes since approximately 2017.  She has been on metformin since diagnosis.  She has been very intermittent with checking her blood sugar.  September 2019 hemoglobin A1c of 7.1 in March 2020 hemoglobin A1c of 6.2.  Currently she is on metformin at 1000 mg twice daily.   She has a strong family history of diabetes.        #2 pregnancy  Patient is currently pregnant.  She is approximately 12 weeks.  She anticipates a due date in November 2020. She reports a previous pregnancy (around 2004) for which she did not have gestational diabetes.      Tanisha reports that she is worried " that her blood sugars are high.  In the last week her fasting blood sugars have been between 98 and 146.  She has been unable to check her blood sugar at work after breakfast or lunch, but eats a larger meal when she arrives home from work and checks her blood sugar 2 hours after that blood sugar at that time has.  Been between 110 and 190.  She is also worried about her diet.  She has nausea and really does not eat very much early in the morning or even at lunch, and mostly is me to eating a dinner.  She is worried as her blood sugar is much higher than it ever was before she was pregnant.  She continues to take metformin 4 tablets daily though she was told she needs to stop this and she wonders how to go about that.    She has not been able to meet with nutrition as they called her but she was still at work.  She asked them to call her back in 2 hours but no one did.  She reports that she cannot have any appointments perform 4:00 because of her work.      Glucometer data:  Fastin-146  2 hours after dinner: 110-190      Past Medical History  Past Medical History:   Diagnosis Date     BMI 37.0-37.9, adult      Hyperlipidemia      PCOS (polycystic ovarian syndrome)      Type 2 diabetes mellitus without complication, with long-term current use of insulin (H)        Allergies  No Known Allergies  Medications  Current Outpatient Medications   Medication Sig Dispense Refill     alcohol swab prep pads Use to swab area of injection/jose as directed. 100 each 3     Continuous Blood Gluc  (FREESTYLE VIRA 14 DAY READER) JAGDEEP Use to read blood sugars as per 's instructions. 1 each 0     Continuous Blood Gluc Sensor (FREESTYLE VIRA 14 DAY SENSOR) MISC Change every 14 days. 2 each 11     insulin aspart (NOVOLOG FLEXPEN) 100 UNIT/ML pen 4 units before breakfast, 4 units before lunch, 4 units before dinner 15 mL 3     insulin detemir (LEVEMIR PEN) 100 UNIT/ML pen Inject 10 Units Subcutaneous At Bedtime  "15 mL 3     insulin pen needle (ULTICARE MICRO) 32G X 4 MM miscellaneous Use 4  pen needles daily or as directed. 100 each 3     metFORMIN (GLUCOPHAGE) 1000 MG tablet Take 1 tablet (1,000 mg) by mouth 2 times daily (with meals) 90 tablet 3     Prenatal Vit-Fe Fumarate-FA (PRENATAL VITAMIN) 27-0.8 MG TABS Take 1 tablet by mouth daily       vitamin D3 (CHOLECALCIFEROL) 2000 units (50 mcg) tablet Take 2 tablets (4,000 Units) by mouth daily Take two tablets daily. 180 tablet 3     Family History  family history includes Diabetes in her maternal grandmother and mother.  Social History    ROS  Per HPI, otherwise review of symptoms was unremarkable      Physical Exam    PHONE VISIT    Tanisha is alerted and oriented.  Mood is \"good,\" affect is congruent.  Thoughtful form and content are fluid and coherent.  No signs of distress are appreciated.      Wt Readings from Last 10 Encounters:   20 83.1 kg (183 lb 3.2 oz)   20 84.1 kg (185 lb 4.8 oz)               RESULTS  Office Visit on 2020   Component Date Value Ref Range Status     Vitamin D Deficiency screening 2020 19* 20 - 75 ug/L Final    Comment: Season, race, dietary intake, and treatment affect the concentration of   25-hydroxy-Vitamin D. Values may decrease during winter months and increase   during summer months. Values 20-29 ug/L may indicate Vitamin D insufficiency   and values <20 ug/L may indicate Vitamin D deficiency.  Vitamin D determination is routinely performed by an immunoassay specific for   25 hydroxyvitamin D3.  If an individual is on vitamin D2 (ergocalciferol)   supplementation, please specify 25 OH vitamin D2 and D3 level determination by   LCMSMS test [id number].       Lab Results   Component Value Date    A1C 6.2 2020       ASSESSMENT:    #1 Type 2 diabetes  35 yo F with   Estimated Date of Delivery: 2020 based on US.  Pregnancy is unplanned but welcomed.  Recent hemoglobin A1c was 6.2 in 2020, is " reassuring.      Tanisha is now 12 weeks pregnant with blood sugars above goal and will be starting Levemir 10 units each evening tomorrow night.  I will write a letter to her employer advising that she does need a break after both breakfast and lunch to check her blood sugar.  We did counseling in regards to diet and eating a nutritious diet, and providing insulin to allow her to eat as needed without having blood sugars that are dangerously high for her infant.  Reviewed the goals of blood sugar during pregnancy.  Fastin-95  2 hours after meals: 80 - 120    She has my pager number and will call me with any numbers at all under 70, or postprandial blood sugars greater than 160.  I will ask that she be rescheduled with nutrition ideally at 4 PM or later.  We will have a visit again next Tuesday at 7 PM.    #2 pregnancy  Continue with OB follow-up.  Taking PNV daily    #3 history of vitamin D deficiency  Patient currently on vitamin D at 4000 IU daily.          Greater than 50% of 25-minute phone call spent in counseling regarding nutrition during pregnancy, why her blood sugars are so high how and why we plan to manage them.      It is my privilege to be involved in the care of the above patient.     Hannah Patel PA-C, MPAS  [facility]  Diabetes, Endocrinology, and Metabolism  [phone] Appointments/Nurse  [phone] Fax  [phone] pager  [phone] nurse line

## 2020-04-30 DIAGNOSIS — R80.9 TYPE 2 DIABETES MELLITUS WITH MICROALBUMINURIA, UNSPECIFIED WHETHER LONG TERM INSULIN USE: Primary | ICD-10-CM

## 2020-04-30 DIAGNOSIS — E11.29 TYPE 2 DIABETES MELLITUS WITH MICROALBUMINURIA, UNSPECIFIED WHETHER LONG TERM INSULIN USE: Primary | ICD-10-CM

## 2020-04-30 RX ORDER — FLASH GLUCOSE SCANNING READER
EACH MISCELLANEOUS
Qty: 1 EACH | Refills: 0 | Status: SHIPPED | OUTPATIENT
Start: 2020-04-30 | End: 2020-08-26

## 2020-05-01 ENCOUNTER — TELEPHONE (OUTPATIENT)
Dept: OBGYN | Facility: CLINIC | Age: 35
End: 2020-05-01

## 2020-05-01 ENCOUNTER — ANCILLARY PROCEDURE (OUTPATIENT)
Dept: ULTRASOUND IMAGING | Facility: CLINIC | Age: 35
End: 2020-05-01
Attending: OBSTETRICS & GYNECOLOGY
Payer: MEDICAID

## 2020-05-01 ENCOUNTER — OFFICE VISIT (OUTPATIENT)
Dept: OBGYN | Facility: CLINIC | Age: 35
End: 2020-05-01
Attending: ADVANCED PRACTICE MIDWIFE
Payer: MEDICAID

## 2020-05-01 ENCOUNTER — APPOINTMENT (OUTPATIENT)
Dept: LAB | Facility: CLINIC | Age: 35
End: 2020-05-01
Attending: ADVANCED PRACTICE MIDWIFE
Payer: MEDICAID

## 2020-05-01 VITALS
HEIGHT: 59 IN | HEART RATE: 71 BPM | BODY MASS INDEX: 36.93 KG/M2 | SYSTOLIC BLOOD PRESSURE: 135 MMHG | WEIGHT: 183.2 LBS | DIASTOLIC BLOOD PRESSURE: 79 MMHG

## 2020-05-01 DIAGNOSIS — O24.111 PRE-EXISTING TYPE 2 DIABETES MELLITUS DURING PREGNANCY IN FIRST TRIMESTER: ICD-10-CM

## 2020-05-01 DIAGNOSIS — O24.111 PRE-EXISTING TYPE 2 DIABETES MELLITUS DURING PREGNANCY IN FIRST TRIMESTER: Primary | ICD-10-CM

## 2020-05-01 LAB
ALBUMIN SERPL-MCNC: 3.2 G/DL (ref 3.4–5)
ALP SERPL-CCNC: 44 U/L (ref 40–150)
ALT SERPL W P-5'-P-CCNC: 27 U/L (ref 0–50)
ANION GAP SERPL CALCULATED.3IONS-SCNC: 10 MMOL/L (ref 3–14)
AST SERPL W P-5'-P-CCNC: 15 U/L (ref 0–45)
BILIRUB SERPL-MCNC: 0.3 MG/DL (ref 0.2–1.3)
BUN SERPL-MCNC: 5 MG/DL (ref 7–30)
CALCIUM SERPL-MCNC: 9.2 MG/DL (ref 8.5–10.1)
CHLORIDE SERPL-SCNC: 108 MMOL/L (ref 94–109)
CO2 SERPL-SCNC: 20 MMOL/L (ref 20–32)
CREAT SERPL-MCNC: 0.51 MG/DL (ref 0.52–1.04)
CREAT UR-MCNC: 144 MG/DL
GFR SERPL CREATININE-BSD FRML MDRD: >90 ML/MIN/{1.73_M2}
GLUCOSE SERPL-MCNC: 125 MG/DL (ref 70–99)
POTASSIUM SERPL-SCNC: 3.3 MMOL/L (ref 3.4–5.3)
PROT SERPL-MCNC: 7.6 G/DL (ref 6.8–8.8)
PROT UR-MCNC: 0.32 G/L
PROT/CREAT 24H UR: 0.22 G/G CR (ref 0–0.2)
SODIUM SERPL-SCNC: 138 MMOL/L (ref 133–144)

## 2020-05-01 PROCEDURE — G0463 HOSPITAL OUTPT CLINIC VISIT: HCPCS | Mod: 25

## 2020-05-01 PROCEDURE — 80053 COMPREHEN METABOLIC PANEL: CPT | Performed by: OBSTETRICS & GYNECOLOGY

## 2020-05-01 PROCEDURE — 84156 ASSAY OF PROTEIN URINE: CPT | Performed by: OBSTETRICS & GYNECOLOGY

## 2020-05-01 PROCEDURE — 36415 COLL VENOUS BLD VENIPUNCTURE: CPT | Performed by: OBSTETRICS & GYNECOLOGY

## 2020-05-01 PROCEDURE — 76801 OB US < 14 WKS SINGLE FETUS: CPT

## 2020-05-01 RX ORDER — INSULIN ASPART 100 [IU]/ML
INJECTION, SOLUTION INTRAVENOUS; SUBCUTANEOUS
Qty: 9 ML | Refills: 3 | Status: SHIPPED | OUTPATIENT
Start: 2020-05-01 | End: 2020-05-01

## 2020-05-01 RX ORDER — INSULIN ASPART 100 [IU]/ML
INJECTION, SOLUTION INTRAVENOUS; SUBCUTANEOUS
Qty: 15 ML | Refills: 3 | Status: SHIPPED | OUTPATIENT
Start: 2020-05-01 | End: 2020-07-10

## 2020-05-01 RX ORDER — PEN NEEDLE, DIABETIC 32GX 5/32"
NEEDLE, DISPOSABLE MISCELLANEOUS
Qty: 100 EACH | Refills: 3 | Status: SHIPPED | OUTPATIENT
Start: 2020-05-01

## 2020-05-01 RX ORDER — GLUCOSAMINE HCL/CHONDROITIN SU 500-400 MG
CAPSULE ORAL
Qty: 100 EACH | Refills: 3 | Status: SHIPPED | OUTPATIENT
Start: 2020-05-01

## 2020-05-01 ASSESSMENT — MIFFLIN-ST. JEOR: SCORE: 1441.62

## 2020-05-01 NOTE — PROGRESS NOTES
33 yo  at 12 weeks with type 2 DM.  Pt did not bring blood sugars but reports DM is out of control.  PP sugars >200 fasting was 170 this am.  Has never used insulin,but is open to it.    O see flow sheet  sono showed viable pregnancy after no doptones heard    A/PViable IUP at 12 weeks with out of control type 2 DM  1) Has endocrine appt  via phone.    Started insulin today Livimir 10 units at bedtime  4 units pre-prandial    2) ordered comprehensive e=metabolic panel and protein to cr ratio.  RTC in 4 weeks in person    3) screening- declines first tri screen. Agrees to level 2 screen. ordered    Radha Trinidad MD

## 2020-05-01 NOTE — TELEPHONE ENCOUNTER
Patient in clinic and started on insulin.  Insulin pen teaching done with demonstration back.   Answered all questions.    Called [facility] and spoke with Sara who confirmed insurance coverage of both levemir and novolog pens and requested Rx changed to dispense 1 full box of pens per fill (5 pens = 15ml). This change was made.

## 2020-05-01 NOTE — LETTER
2020       RE: Tanisha Valenzuela  [address] [place] [facility] [address]     Dear Colleague,    Thank you for referring your patient, Tanisha Valenzuela, to the [facility] at [facility]. Please see a copy of my visit note below.    33 yo  at 12 weeks with type 2 DM.  Pt did not bring blood sugars but reports DM is out of control.  PP sugars >200 fasting was 170 this am.  Has never used insulin,but is open to it.    O see flow sheet  sono showed viable pregnancy after no doptones heard    A/PViable IUP at 12 weeks with out of control type 2 DM  1) Has endocrine appt  via phone.    Started insulin today Livimir 10 units at bedtime  4 units pre-prandial    2) ordered comprehensive e=metabolic panel and protein to cr ratio.  RTC in 4 weeks in person    3) screening- declines first tri screen. Agrees to level 2 screen. ordered    Radha Trinidad MD      Again, thank you for allowing me to participate in the care of your patient.      Sincerely,    Radha Trinidad MD

## 2020-05-04 DIAGNOSIS — E11.29 TYPE 2 DIABETES MELLITUS WITH MICROALBUMINURIA, UNSPECIFIED WHETHER LONG TERM INSULIN USE: ICD-10-CM

## 2020-05-04 DIAGNOSIS — E28.2 PCOS (POLYCYSTIC OVARIAN SYNDROME): ICD-10-CM

## 2020-05-04 DIAGNOSIS — O09.90 HIGH RISK PREGNANCY, ANTEPARTUM: Primary | ICD-10-CM

## 2020-05-04 DIAGNOSIS — R80.9 TYPE 2 DIABETES MELLITUS WITH MICROALBUMINURIA, UNSPECIFIED WHETHER LONG TERM INSULIN USE: ICD-10-CM

## 2020-05-04 DIAGNOSIS — Z98.891 PREVIOUS CESAREAN SECTION: ICD-10-CM

## 2020-05-04 NOTE — TELEPHONE ENCOUNTER
Pharmacy notes that they are aunable to dispense anything less than once box which is = 15 ML =5 pens on the Determir insulin and novolog.     Spoke with [facility] and reviewed this is what has been sent.  Spoke with Dale at [facility] pharmacy, scripts have been run through, no concerns, disregard.

## 2020-05-05 ENCOUNTER — VIRTUAL VISIT (OUTPATIENT)
Dept: ENDOCRINOLOGY | Facility: CLINIC | Age: 35
End: 2020-05-05
Payer: MEDICAID

## 2020-05-05 ENCOUNTER — APPOINTMENT (OUTPATIENT)
Dept: INTERPRETER SERVICES | Facility: CLINIC | Age: 35
End: 2020-05-05
Payer: MEDICAID

## 2020-05-05 DIAGNOSIS — O24.119 TYPE 2 DIABETES MELLITUS AFFECTING PREGNANCY, ANTEPARTUM: Primary | ICD-10-CM

## 2020-05-05 DIAGNOSIS — E66.09 OBESITY DUE TO EXCESS CALORIES WITH SERIOUS COMORBIDITY, UNSPECIFIED CLASSIFICATION: ICD-10-CM

## 2020-05-05 DIAGNOSIS — O09.90 HIGH RISK PREGNANCY, ANTEPARTUM: ICD-10-CM

## 2020-05-06 NOTE — PATIENT INSTRUCTIONS
Siempre un gusto conversar con Ud.     Por favor, sube barrera insulina a 11 unidades cada noche.    Avisame si hay azucar <70 o mas >200.    Jerardo el embarazo nuestra metas para el azucar  Son:    En ayunas: 80 - 95  2 horas despues de comer: <120    My best wishes,    Hannah Patel PA-C, MPAS  [facility]  Diabetes, Endocrinology, and Metabolism  [phone] Appointments/Nurse  [phone] Fax  [phone] [place]  [phone] [place]  [phone] URGENTafter hours/weekend Endocrinologist on call

## 2020-05-06 NOTE — PROGRESS NOTES
"    Tanisha Valenzuela is a 34 year old female who is being evaluated via a billable telephone visit.      The patient has been notified of following:     \"This telephone visit will be conducted via a call between you and your physician/provider. We have found that certain health care needs can be provided without the need for a physical exam.  This service lets us provide the care you need with a short phone conversation.  If a prescription is necessary we can send it directly to your pharmacy.  If lab work is needed we can place an order for that and you can then stop by our lab to have the test done at a later time.    Telephone visits are billed at different rates depending on your insurance coverage. During this emergency period, for some insurers they may be billed the same as an in-person visit.  Please reach out to your insurance provider with any questions.    If during the course of the call the physician/provider feels a telephone visit is not appropriate, you will not be charged for this service.\"    Patient has given verbal consent for Video visit?  Yes        Am Well did not work.  ; VIDEO call via TutorialTab 26 minutes.       Tanisha Valenzuela is a 34 year old female who is being evaluated via a billable video visit.          Virtual Visit  Hannah Patel PA-C  5/5/2020      HPI  #1 type 2 diabetes  Per patient report, she is had diabetes since approximately 2017.  She has been on metformin since diagnosis.  She has been very intermittent with checking her blood sugar.  September 2019 hemoglobin A1c of 7.1 in March 2020 hemoglobin A1c of 6.2.  Currently she is on metformin at 1000 mg twice daily.   She has a strong family history of diabetes.        #2 pregnancy  Patient is currently pregnant.  She is approximately 12 weeks 4 days.  She anticipates a due date in November 2020. She reports a previous pregnancy (around 2004) for which she did not have gestational diabetes.      Tanisha reports that she " is worried that her blood sugars are high after lunch.  Today her blood sugar was 213 after lunch and yesterday it was 208.  Prior to that her blood sugars were more normal, but still highest after lunch as she recalls 147 140 111, the highest prior was the 1 she had told me about last week when we spoke at 190.    Her fasting blood sugar was 111 this morning, and she just recalled that she did forget to give Levemir last night which may help explain the very high blood sugar after lunch today.  Nonetheless she notes that it is curious that today after dinner her blood sugar was just 88.  She has not been checking after breakfast because she just has a yogurt or a gelatin without sugar.  She wonders if she can eat oranges if she can eat gelatin it without sugar at night she wonders how bad it is to eat at night she generally tries not to eat after 6 PM.      Glucometer data since starting Levemir:  Fastin, 98, 94, 92,  2 h p lunch: 213 208 147, 1 40, 111  2 hours after dinner: 88, 111, 98, 117      Past Medical History  Past Medical History:   Diagnosis Date     BMI 37.0-37.9, adult      Hyperlipidemia      PCOS (polycystic ovarian syndrome)      Type 2 diabetes mellitus without complication, with long-term current use of insulin (H)        Allergies  No Known Allergies  Medications  Current Outpatient Medications   Medication Sig Dispense Refill     alcohol swab prep pads Use to swab area of injection/jose as directed. 100 each 3     Continuous Blood Gluc  (FREESTYLE VIRA 14 DAY READER) JAGDEEP Use to read blood sugars as per 's instructions. 1 each 0     Continuous Blood Gluc Sensor (FREESTYLE VIRA 14 DAY SENSOR) MISC Change every 14 days. 2 each 11     insulin aspart (NOVOLOG FLEXPEN) 100 UNIT/ML pen 4 units before breakfast, 4 units before lunch, 4 units before dinner 15 mL 3     insulin detemir (LEVEMIR PEN) 100 UNIT/ML pen Inject 10 Units Subcutaneous At Bedtime 15 mL 3     insulin  "pen needle (ULTICARE MICRO) 32G X 4 MM miscellaneous Use 4  pen needles daily or as directed. 100 each 3     metFORMIN (GLUCOPHAGE) 1000 MG tablet Take 1 tablet (1,000 mg) by mouth 2 times daily (with meals) 90 tablet 3     Prenatal Vit-Fe Fumarate-FA (PRENATAL VITAMIN) 27-0.8 MG TABS Take 1 tablet by mouth daily       vitamin D3 (CHOLECALCIFEROL) 2000 units (50 mcg) tablet Take 2 tablets (4,000 Units) by mouth daily Take two tablets daily. 180 tablet 3     Family History  family history includes Diabetes in her maternal grandmother and mother.  Social History    ROS  Per HPI, otherwise review of symptoms was unremarkable      Physical Exam    PHONE VISIT    Tanisha is alerted and oriented.  Mood is \"good,\" affect is congruent.  Thoughtful form and content are fluid and coherent.  No signs of distress are appreciated.      Wt Readings from Last 10 Encounters:   20 83.1 kg (183 lb 3.2 oz)   20 84.1 kg (185 lb 4.8 oz)               RESULTS  Office Visit on 2020   Component Date Value Ref Range Status     Vitamin D Deficiency screening 2020 19* 20 - 75 ug/L Final    Comment: Season, race, dietary intake, and treatment affect the concentration of   25-hydroxy-Vitamin D. Values may decrease during winter months and increase   during summer months. Values 20-29 ug/L may indicate Vitamin D insufficiency   and values <20 ug/L may indicate Vitamin D deficiency.  Vitamin D determination is routinely performed by an immunoassay specific for   25 hydroxyvitamin D3.  If an individual is on vitamin D2 (ergocalciferol)   supplementation, please specify 25 OH vitamin D2 and D3 level determination by   LCMSMS test [id number].       Lab Results   Component Value Date    A1C 6.2 2020       ASSESSMENT:    #1 Type 2 diabetes  35 yo F with  with type 2 diabetes and gestation at 12 weeks 4 days.  Pregnancy is unplanned but welcomed.  Recent hemoglobin A1c was 6.2 in 2020, is reassuring.      Tanisha is now " 12 weeks pregnant with blood sugars above goal and did start taking Levemir last Saturday night.  With this her blood sugars have come down on days she has remembered it except for 1 very high blood sugar after lunch yesterday.  I discussed how to remember take the Levemir every night just before bedtime.  Encouraging her to try to eat less carbohydrate at lunch and to check her blood sugars after breakfast.  If they continue to be elevated when she meets with Radha on Thursday, and they do not think that they can correct this with diet, I am asking Radha to contact me and we will look at starting either a second injection of Levemir or some NovoLog with her lunch.  She  continues to take metformin and we will begin to taper this once her blood sugars are at goal.  She has my pager number and will call me with any numbers at all under 70.      #2 pregnancy  Continue with OB follow-up.  Taking PNV daily    #3 history of vitamin D deficiency  Patient currently on vitamin D at 4000 IU daily.    Follow-up as scheduled 1 week from today.  We do need to get her scheduled with diabetes education.      Greater than 50% of 25-minute phone call spent in counseling regarding nutrition during pregnancy, why her blood sugars are so high how and why we plan to manage them.      It is my privilege to be involved in the care of the above patient.     Hannah Patel PA-C, MPAS  [facility]  Diabetes, Endocrinology, and Metabolism  [phone] Appointments/Nurse  [phone] Fax  [phone] pager  [phone] nurse line

## 2020-05-07 ENCOUNTER — VIRTUAL VISIT (OUTPATIENT)
Dept: EDUCATION SERVICES | Facility: CLINIC | Age: 35
End: 2020-05-07
Payer: MEDICAID

## 2020-05-07 DIAGNOSIS — E11.29 TYPE 2 DIABETES MELLITUS WITH MICROALBUMINURIA, UNSPECIFIED WHETHER LONG TERM INSULIN USE: Primary | ICD-10-CM

## 2020-05-07 DIAGNOSIS — R80.9 TYPE 2 DIABETES MELLITUS WITH MICROALBUMINURIA, UNSPECIFIED WHETHER LONG TERM INSULIN USE: Primary | ICD-10-CM

## 2020-05-08 NOTE — PROGRESS NOTES
"Diabetes Self-Management Education & Support  This visit was conducted via telephone.  Patient no showed a video visit today.  Call made to patient and Patient agreed to telephone visit: Yes  This is my first time talking with Tanisha.    Presents for: Nutrition and education related to type 2 diabetes and pregnancy    SUBJECTIVE/OBJECTIVE:     Cultural Influences/Ethnic Background:  Choose not to answer    Patient was diagnosed with type 2 diabetes approximately 3 to 4 years ago.  She is currently approximately 13 weeks pregnant.  She had diabetes education when she was first diagnosed at the [facility].  She tells me that she does not remember much from that time as it was a while back now.  Her weight before pregnancy was approximately 179 to 180 pounds.  She was referred by Michaelle JENNINGS    Diabetes Symptoms & Complications:    Patient Problem List and Family Medical History reviewed for relevant medical history, current medical status, and diabetes risk factors.    Vitals:  LMP 02/01/2020   Estimated body mass index is 37 kg/m  as calculated from the following:    Height as of 5/1/20: 1.499 m (4' 11\").    Weight as of 5/1/20: 83.1 kg (183 lb 3.2 oz).   Last 3 BP:   BP Readings from Last 3 Encounters:   05/01/20 135/79   03/30/20 111/65       History   Smoking Status     Former Smoker   Smokeless Tobacco     Never Used       Labs:  Lab Results   Component Value Date    A1C 6.2 03/30/2020     Lab Results   Component Value Date     05/01/2020     No results found for: LDL  No results found for: HDL]  GFR Estimate   Date Value Ref Range Status   05/01/2020 >90 >60 mL/min/[1.73_m2] Final     Comment:     Non  GFR Calc  Starting 12/18/2018, serum creatinine based estimated GFR (eGFR) will be   calculated using the Chronic Kidney Disease Epidemiology Collaboration   (CKD-EPI) equation.       GFR Estimate If Black   Date Value Ref Range Status   05/01/2020 >90 >60 mL/min/[1.73_m2] Final     " Comment:      GFR Calc  Starting 12/18/2018, serum creatinine based estimated GFR (eGFR) will be   calculated using the Chronic Kidney Disease Epidemiology Collaboration   (CKD-EPI) equation.       Lab Results   Component Value Date    CR 0.51 05/01/2020     No results found for: MICROALBUMIN    Monitoring:  Jermain is currently taking 11 units of Levemir, which was increased 2 days ago.  She is also taking 4 units of NovoLog with each meal.  Her glucose since her visit with Michaelle 2 days ago have been as follows:  Fasting 98, 86  2 hours after breakfast 131, 122  2 hours after lunch 171, 165  2 hours after dinner 151  For additional blood sugar readings please see progress note from Michaelle Patel  Patient has just started testing her blood sugar after breakfast for the past couple days.    Her glucose readings consistently high after lunch.  We  discussed patient's diet in detail today.  For breakfast she tends to have a Greek yogurt with 16 g of carbohydrate or to corn tortillas with sauce chicken and veg with 24 g of carbohydrate.  Lunch has been 2 tortillas 24 g of carbohydrate with turkey, tomato, onion, garlic.  She uses avocado oil. Sometimes she will have some additional food or snack that her friends bring to work.  They all eat together.  This can be a challenge for her.  Illegally she states she has been really watching her portions.  For example she will have 5-6 she does.  She does have a good understanding of general healthy eating and is trying to watch what she is eating and do her best.  Other days for lunch she had 3 tortillas with some potato and an orange 81 g of carbohydrate.  A sample dinner for her was 3 tortillas, turkey, onion, cilantro, reddish 36 g of carbohydrate.  Other snacks include fruit such as orange or jena.  She is not drinking any sweetened beverages.  She is decreased or stopped cookies and has decreased her consumption of bread.  She will sometimes have small  portions of rice or beans.  Patient has been cutting back the carbs in her diet to help to control her blood glucose, but her blood glucose particularly at lunch continues to remain elevated.  I recommended to her that she increase her NovoLog at lunch to 5 units.  And recommended that she continue to test her blood sugar 4 times per day.  So that we can get more readings for post breakfast in particular.  She has follow-up with Michaelle again next Tuesday.    Taking Medications:  Diabetes Medication(s)     Biguanides       metFORMIN (GLUCOPHAGE) 1000 MG tablet    Take 1 tablet (1,000 mg) by mouth 2 times daily (with meals)    Insulin       insulin aspart (NOVOLOG FLEXPEN) 100 UNIT/ML pen    4 units before breakfast, 4 units before lunch, 4 units before dinner     insulin detemir (LEVEMIR PEN) 100 UNIT/ML pen    Inject 10 Units Subcutaneous At Bedtime        Healthy Coping:     Patient Activation Measure Survey Score:  No flowsheet data found.    Diabetes knowledge and skills assessment:   Based on learning assessment above, most appropriate setting for further diabetes education would be: Individual setting.      INTERVENTIONS:  Education provided today on general healthy eating, healthy eating during pregnancy, carbohydrate counting and meal planning during pregnancy  The relationship between carbohydrate and glucose, identifying carbohydrate containing foods, how to use a nutrition facts label, reviewed written and online or елена based resources, how to estimate carbs and how to count carbs when eating out, measuring foods and estimating portions  Patient was easily able to verbalize and demonstrate understanding of material covered covered today    PLAN  Continue to test blood glucose 4 times daily.  Aim for 30 to 45 g of carbohydrate at breakfast, 45 to 60 g at lunch and dinner, and 15 to 30 g at snacks.  Include nutritious sources of protein, fat and non-starchy vegetables at meals along with  carbohydrate  Increase NovoLog at lunchtime to 5 units  Follow-up with Michaelle next Tuesday  Follow-up with me on May 22  Time Spent: 75 minutes  Encounter Type: Individual    Any diabetes medication dose changes were made via the CDE Protocol and Collaborative Practice Agreement with the patient's referring provider. A copy of this encounter was shared with the provider.

## 2020-05-11 NOTE — PROGRESS NOTES
"Week of__/__/__ Date  __/__  Date  __/__ Date  __/__ Date  __/__ Date  __/__ Date  __/__ Date  __/__    Time BG Time BG Time BG Time BG Time BG Time BG Time BG                                                                             Tanisha Valenzuela is a 34 year old female who is being evaluated via a billable video visit.      The patient has been notified of following:     \"This video visit will be conducted via a call between you and your physician/provider. We have found that certain health care needs can be provided without the need for an in-person physical exam.  This service lets us provide the care you need with a video conversation.  If a prescription is necessary we can send it directly to your pharmacy.  If lab work is needed we can place an order for that and you can then stop by our lab to have the test done at a later time.    Video visits are billed at different rates depending on your insurance coverage.  Please reach out to your insurance provider with any questions.    If during the course of the call the physician/provider feels a video visit is not appropriate, you will not be charged for this service.\"    Patient has given verbal consent for Video visit? Yes    How would you like to obtain your AVS? Mail a copy    Patient would like the video invitation sent by: Text to cell phone:  [phone]    Will anyone else be joining your video visit? No      No answer 4:30, 4:38. 4:50, 7:20      This patient was a no show for this scheduled appointment.            "

## 2020-05-12 ENCOUNTER — VIRTUAL VISIT (OUTPATIENT)
Dept: ENDOCRINOLOGY | Facility: CLINIC | Age: 35
End: 2020-05-12
Payer: MEDICAID

## 2020-05-12 DIAGNOSIS — Z53.9 NO SHOW: Primary | ICD-10-CM

## 2020-05-22 ENCOUNTER — VIRTUAL VISIT (OUTPATIENT)
Dept: EDUCATION SERVICES | Facility: CLINIC | Age: 35
End: 2020-05-22
Payer: MEDICAID

## 2020-05-22 DIAGNOSIS — O24.419 GESTATIONAL DIABETES: Primary | ICD-10-CM

## 2020-06-05 ENCOUNTER — VIRTUAL VISIT (OUTPATIENT)
Dept: MATERNAL FETAL MEDICINE | Facility: CLINIC | Age: 35
End: 2020-06-05
Attending: OBSTETRICS & GYNECOLOGY
Payer: COMMERCIAL

## 2020-06-05 DIAGNOSIS — O09.90 HIGH RISK PREGNANCY, ANTEPARTUM: ICD-10-CM

## 2020-06-05 DIAGNOSIS — Z36.9 ENCOUNTER FOR ANTENATAL SCREENING OF MOTHER: Primary | ICD-10-CM

## 2020-06-05 DIAGNOSIS — O24.919 DIABETES IN PREGNANCY: ICD-10-CM

## 2020-06-05 PROCEDURE — 96040 ZZH GENETIC COUNSELING, EACH 30 MINUTES: CPT | Mod: TEL,ZF | Performed by: GENETIC COUNSELOR, MS

## 2020-06-05 NOTE — PROGRESS NOTES
[facility]  Genetic Counseling Consult    Patient: Tanisha Valenzuela YOB: 1985   Date of Service: 6/05/20      Tanisha Valenzuela was evaluated via a billable telephone visit at [facility] for genetic consultation to discuss the options for screening and testing for fetal chromosome abnormalities. The patient has preexisting type II diabetes and is scheduled for a comprehensive ultrasound with our clinic.    The patient has been notified of following:    This telephone visit will be conducted via a call between you and your physician/provider. We have found that certain health care needs can be provided without the need for a physical exam. This service lets us provide the care you need with a short phone conversation. If a prescription is necessary we can send it directly to your pharmacy. If lab work is needed we can place an order for that and you can then stop by our lab to have the test done at a later time.     If during the course of the call the provider feels a telephone visit is not appropriate, you will not be charged for this service.       Impression/Plan:   1. The patient declined a detailed discussion of genetic screening and diagnostic testing options available to her in the current pregnancy. The patient was made aware that she has genetic testing options available to her should she wish to proceed with testing at any point in the future.    2.  Maternal serum AFP (single marker screen) is recommended after 15 weeks to screen for open neural tube defects. A quad screen should not be performed.    3.  The patient is scheduled for a comprehesive ultrasound with our [facility] on June 12, 2020. Please see the ultrasound report for additional details. The patient has requested that the predicted sex of the pregnancy not be shared with her during the ultrasound. She would like the predicted sex of  the baby to be put into an envelope and given to her instead.    Pregnancy History:   /Parity:    Age at Delivery: 35 year old  VIKAS: 2020, by Ultrasound  Gestational Age: 17w0d    No significant complications or exposures were reported in the current pregnancy.    Tanisha green pregnancy history is significant for:  o 2004: female, 41w0d c/s delivery. She is in good health today.     Medical History:   Tanisha has a personal history of pregestational diabetes mellitus type II. Pregestational diabetes mellitus (PGDM), both type I and type II, is associated with a risk of major birth defects of 6-10% (2-3x general population risk).  The most common and severe congenital anomalies associated with diabetic pregnancy are neural tube defects and cardiovascular malformations; however, diabetic embryopathy can affect any developing organ system.  PGDM is also associated with pregnancy loss in 15-20% of pregnancies, mostly in the first trimester. High HbA1c and high blood glucose levels in the first trimester are correlated with increased risk of birth defects, spontaneous abortions, stillbirths and other pregnancy complications.  Maternal complications may include risk of progression of diabetic retinopathy, risk of progressive diabetic nephropathy, and increased risk of hypertension including intrauterine growth restriction (IUGR), preeclampsia, abruption placenta, and stroke.  Uncontrolled diabetes in the second and third trimesters increases risk of IUGR; macrosomia;  labor;  jaundice, hypoglycemia, respiratory distress, and transient tachypnea;  mortality; and developmental defects including lower fine and gross motor skills scores and poorer language abilities. Folic acid supplementation prior to conception and throughout the first trimester is recommended.    We discussed that the average pregnancy has a 3-5% chance of having a baby with a birth defect. Maternal diabetes increases that  chance to 6-10% and possibly up to 20% if it is poorly controlled in the first trimester. These birth defects can include spinal cord defects (spina bifida), heart defects, skeletal defects, and defects in the urinary, reproductive, and digestive systems. Diabetes in the pregnancy can also lead to complications such as pre-eclampsia, polyhydramnios, and  delivery.       Family History:   A three-generation pedigree was obtained, and is scanned under the  Media  tab.     The following significant findings were reported by Tanisha:    Father of the baby: The patient's  Zander is 36 years old and in good health. He has no children from a previous partnership.    Mother: diabetes type II    Sister: prediabetic    Zander's aunts and uncles: type II diabetes    Otherwise, the reported family history is negative for multiple miscarriages, stillbirths, birth defects, intellectual disabilities, known genetic conditions, and consanguinity.    Diabetes is a complex genetic trait (a multifactorial condition) caused by the combination of genetic and environmental factors. Having a family history of diabetes can increase one's risk of also developing diabetes. For men with type I diabetes their children have a 1 in 17 chance of developing diabetes during their lifetime. However, for woman with type I diabetes, if their child is born before 25 there is a 1 in 25 risk and if their child was born after 25 there is a 1 in 100 risk. If a parent develops diabetes before age 11, their child's risk is typically doubled. Furthermore, if both parents have type I diabetes, the risk is between 1 in 10 to 1 in 4. These risk numbers are an estimate and can be complicated by many other factors such as autoimmune disorders and lifestyle choices. Therefore, there is currently no prenatal genetic testing we would offer the patient at this time to assess for diabetes risks in the current pregnancy.        Carrier Screening:   The patient  reports that she and the father of the pregnancy have [place]/a ancestry:       Expanded carrier screening for mutations in a large panel of genes associated with autosomal recessive conditions including cystic fibrosis, spinal muscular atrophy, and others, is now available.      The patient has declined carrier screening.       Risk Assessment for Chromosome Conditions:   We explained that the risk for fetal chromosome abnormalities increases with maternal age. We discussed specific features of common chromosome abnormalities, including Down syndrome, trisomy 13, trisomy 18, and sex chromosome trisomies.      - At age 35 at midtrimester, the risk to have a baby with Down syndrome is 1 in 342.     - At age 35 at midtrimester, the risk to have a baby with any chromosome abnormality is 1 in  172.        Testing Options:   I briefly mentioned the option to pursue genetic screening (NIPT) and or diagnostic testing (amniocentesis) to the patient during today's consult. We did not review the details of of each type of testing per the patient's request. We spent the majority of today's consult reviewing what to expect for the patient's upcoming comprehensive ultrasound:      Comprehensive (Level II) ultrasound: Detailed ultrasound performed between 18-22 weeks gestation to screen for major birth defects and markers for aneuploidy.    We reviewed the benefits and limitations of this testing.  Screening tests provide a risk assessment specific to the pregnancy for certain fetal chromosome abnormalities, but cannot definitively diagnose or exclude a fetal chromosome abnormality.  Follow-up genetic counseling and consideration of diagnostic testing is recommended with any abnormal screening result.     Diagnostic tests carry inherent risks- including risk of miscarriage- that require careful consideration.  These tests can detect fetal chromosome abnormalities with greater than 99% certainty.  Results can be compromised by  maternal cell contamination or mosaicism, and are limited by the resolution of cytogenetic G-banding technology.  There is no screening nor diagnostic test that can detect all forms of birth defects or mental disability.     It was a pleasure to be involved with Tanisha s care.     Total telephone call contact time  Call started at 12:00PM  Call ended at 12:30PM    Tasia Gonzalez MS, [facility]  Genetic Counselor  [facility]  Maternal Fetal Medicine  [email or web address]  Ph: [phone]  Pager: [phone]

## 2020-06-10 ENCOUNTER — PRE VISIT (OUTPATIENT)
Dept: MATERNAL FETAL MEDICINE | Facility: CLINIC | Age: 35
End: 2020-06-10

## 2020-06-12 ENCOUNTER — HOSPITAL ENCOUNTER (OUTPATIENT)
Dept: ULTRASOUND IMAGING | Facility: CLINIC | Age: 35
End: 2020-06-12
Attending: OBSTETRICS & GYNECOLOGY
Payer: COMMERCIAL

## 2020-06-12 ENCOUNTER — OFFICE VISIT (OUTPATIENT)
Dept: MATERNAL FETAL MEDICINE | Facility: CLINIC | Age: 35
End: 2020-06-12
Attending: OBSTETRICS & GYNECOLOGY
Payer: COMMERCIAL

## 2020-06-12 DIAGNOSIS — O24.112 PRE-EXISTING TYPE 2 DIABETES MELLITUS DURING PREGNANCY IN SECOND TRIMESTER: Primary | ICD-10-CM

## 2020-06-12 DIAGNOSIS — E11.29 TYPE 2 DIABETES MELLITUS WITH MICROALBUMINURIA, UNSPECIFIED WHETHER LONG TERM INSULIN USE: ICD-10-CM

## 2020-06-12 DIAGNOSIS — Z98.891 PREVIOUS CESAREAN SECTION: ICD-10-CM

## 2020-06-12 DIAGNOSIS — O26.90 PREGNANCY RELATED CONDITION, ANTEPARTUM: ICD-10-CM

## 2020-06-12 DIAGNOSIS — R80.9 TYPE 2 DIABETES MELLITUS WITH MICROALBUMINURIA, UNSPECIFIED WHETHER LONG TERM INSULIN USE: ICD-10-CM

## 2020-06-12 DIAGNOSIS — E28.2 PCOS (POLYCYSTIC OVARIAN SYNDROME): ICD-10-CM

## 2020-06-12 DIAGNOSIS — O09.90 HIGH RISK PREGNANCY, ANTEPARTUM: ICD-10-CM

## 2020-06-12 PROCEDURE — 76811 OB US DETAILED SNGL FETUS: CPT

## 2020-06-12 RX ORDER — ASPIRIN 81 MG/1
81 TABLET, CHEWABLE ORAL ONCE
Status: DISCONTINUED | OUTPATIENT
Start: 2020-06-12 | End: 2020-09-02

## 2020-06-12 NOTE — PROGRESS NOTES
"Please see \"Imaging\" tab under \"Chart Review\" for details of today's US.    Alejandra Ford, DO    "

## 2020-06-22 ENCOUNTER — VIRTUAL VISIT (OUTPATIENT)
Dept: OBGYN | Facility: CLINIC | Age: 35
End: 2020-06-22
Attending: OBSTETRICS & GYNECOLOGY
Payer: COMMERCIAL

## 2020-06-22 DIAGNOSIS — O09.892 SUPERVISION OF OTHER HIGH RISK PREGNANCIES, SECOND TRIMESTER: Primary | ICD-10-CM

## 2020-06-22 DIAGNOSIS — Z98.891 PREVIOUS CESAREAN SECTION: ICD-10-CM

## 2020-06-22 DIAGNOSIS — O24.112 PRE-EXISTING TYPE 2 DIABETES MELLITUS DURING PREGNANCY IN SECOND TRIMESTER: ICD-10-CM

## 2020-06-22 NOTE — PROGRESS NOTES
"Tanisha Valenzuela is a 35 year old female who is being evaluated via a billable telephone visit.      The patient has been notified of following:     \"This telephone visit will be conducted via a call between you and your physician/provider. We have found that certain health care needs can be provided without the need for a physical exam.  This service lets us provide the care you need with a short phone conversation.  If a prescription is necessary we can send it directly to your pharmacy.  If lab work is needed we can place an order for that and you can then stop by our lab to have the test done at a later time.    Telephone visits are billed at different rates depending on your insurance coverage. During this emergency period, for some insurers they may be billed the same as an in-person visit.  Please reach out to your insurance provider with any questions.    If during the course of the call the physician/provider feels a telephone visit is not appropriate, you will not be charged for this service.\"    Patient has given verbal consent for Telephone visit?  Yes    What phone number would you like to be contacted at? [phone]    How would you like to obtain your AVS? Lisbet     S:  Pt is a 36 y/o  at 19+4 weeks who presents for a return OB viist.  She has no concerns today, hasn't felt FM yet, no cramping.  No concerns, no FM yet.   Has been taking her insulin but hadn't been checking sugars regularly until last week.  Her fasting glucose today was 107 today, rage ,  Most of her 2h postprandial have been normal, aparna 167 but because she ate more carbs that she should have.      O: no vitals, has not been able to check her weight or BP at home    gen-normal speech and affect    A:  36 y/o  at 19+4 weeks, doing well.  Pregnancy complicated by type II DM, on insulin but with only about a week of blood sugars to review at this time.  She is established with endocrine clinic-last pone " visit was early May    P:  Fetal echo on 7/10 as planned-asked her to schedule an in person PEDRO LUIS visit then with any provider as she has not been seen in person since 12 weeks.  Endocrine follow up with Dr. Tejada on 7/17 is scheduled.  Very briefly discussed mode of delivery-she would like a TOLAC but is very reasonable in her expectations and would have a repeat c/s if it was felt that was safer for her.      Chanelle Hall MD, FACOG    Phone call duration: 15 minutes

## 2020-06-22 NOTE — LETTER
"2020       RE: Tanisha Valenzuela  [address] [facility] [address]     Dear Colleague,    Thank you for referring your patient, Tanisha Valenzuela, to the [facility] at [facility]. Please see a copy of my visit note below.    Tanisha Valenzuela is a 35 year old female who is being evaluated via a billable telephone visit.      The patient has been notified of following:     \"This telephone visit will be conducted via a call between you and your physician/provider. We have found that certain health care needs can be provided without the need for a physical exam.  This service lets us provide the care you need with a short phone conversation.  If a prescription is necessary we can send it directly to your pharmacy.  If lab work is needed we can place an order for that and you can then stop by our lab to have the test done at a later time.    Telephone visits are billed at different rates depending on your insurance coverage. During this emergency period, for some insurers they may be billed the same as an in-person visit.  Please reach out to your insurance provider with any questions.    If during the course of the call the physician/provider feels a telephone visit is not appropriate, you will not be charged for this service.\"    Patient has given verbal consent for Telephone visit?  Yes    What phone number would you like to be contacted at? [phone]    How would you like to obtain your AVS? Lisbet     S:  Pt is a 34 y/o  at 19+4 weeks who presents for a return OB viist.  She has no concerns today, hasn't felt FM yet, no cramping.  No concerns, no FM yet.   Has been taking her insulin but hadn't been checking sugars regularly until last week.  Her fasting glucose today was 107 today, rage ,  Most of her 2h postprandial have been normal, aparna 167 but because she ate more carbs that she should have.      O: no " vitals, has not been able to check her weight or BP at home    gen-normal speech and affect    A:  36 y/o  at 19+4 weeks, doing well.  Pregnancy complicated by type II DM, on insulin but with only about a week of blood sugars to review at this time.  She is established with endocrine clinic-last pone visit was early May    P:  Fetal echo on 7/10 as planned-asked her to schedule an in person PEDRO LUIS visit then with any provider as she has not been seen in person since 12 weeks.  Endocrine follow up with Dr. Tejada on  is scheduled.  Very briefly discussed mode of delivery-she would like a TOLAC but is very reasonable in her expectations and would have a repeat c/s if it was felt that was safer for her.      Chanelle Hall MD, FACOG    Phone call duration: 15 minutes

## 2020-07-10 ENCOUNTER — TELEPHONE (OUTPATIENT)
Dept: ENDOCRINOLOGY | Facility: CLINIC | Age: 35
End: 2020-07-10

## 2020-07-10 ENCOUNTER — HOSPITAL ENCOUNTER (OUTPATIENT)
Dept: CARDIOLOGY | Facility: CLINIC | Age: 35
End: 2020-07-10
Attending: OBSTETRICS & GYNECOLOGY
Payer: COMMERCIAL

## 2020-07-10 ENCOUNTER — OFFICE VISIT (OUTPATIENT)
Dept: OBGYN | Facility: CLINIC | Age: 35
End: 2020-07-10
Attending: ADVANCED PRACTICE MIDWIFE
Payer: COMMERCIAL

## 2020-07-10 VITALS
SYSTOLIC BLOOD PRESSURE: 109 MMHG | BODY MASS INDEX: 37.12 KG/M2 | HEART RATE: 87 BPM | DIASTOLIC BLOOD PRESSURE: 71 MMHG | HEIGHT: 59 IN | WEIGHT: 184.1 LBS

## 2020-07-10 DIAGNOSIS — Z98.891 PREVIOUS CESAREAN SECTION: ICD-10-CM

## 2020-07-10 DIAGNOSIS — O24.111 PRE-EXISTING TYPE 2 DIABETES MELLITUS DURING PREGNANCY IN FIRST TRIMESTER: ICD-10-CM

## 2020-07-10 DIAGNOSIS — E55.9 VITAMIN D DEFICIENCY: ICD-10-CM

## 2020-07-10 DIAGNOSIS — O09.90 HIGH RISK PREGNANCY, ANTEPARTUM: Primary | ICD-10-CM

## 2020-07-10 DIAGNOSIS — O24.112 PRE-EXISTING TYPE 2 DIABETES MELLITUS DURING PREGNANCY IN SECOND TRIMESTER: ICD-10-CM

## 2020-07-10 DIAGNOSIS — E66.01 CLASS 2 SEVERE OBESITY DUE TO EXCESS CALORIES WITH SERIOUS COMORBIDITY AND BODY MASS INDEX (BMI) OF 37.0 TO 37.9 IN ADULT (H): Chronic | ICD-10-CM

## 2020-07-10 DIAGNOSIS — E66.812 CLASS 2 SEVERE OBESITY DUE TO EXCESS CALORIES WITH SERIOUS COMORBIDITY AND BODY MASS INDEX (BMI) OF 37.0 TO 37.9 IN ADULT (H): Chronic | ICD-10-CM

## 2020-07-10 DIAGNOSIS — E11.29 TYPE 2 DIABETES MELLITUS WITH MICROALBUMINURIA, UNSPECIFIED WHETHER LONG TERM INSULIN USE: ICD-10-CM

## 2020-07-10 DIAGNOSIS — E28.2 PCOS (POLYCYSTIC OVARIAN SYNDROME): ICD-10-CM

## 2020-07-10 DIAGNOSIS — R80.9 TYPE 2 DIABETES MELLITUS WITH MICROALBUMINURIA, UNSPECIFIED WHETHER LONG TERM INSULIN USE: ICD-10-CM

## 2020-07-10 PROCEDURE — 76827 ECHO EXAM OF FETAL HEART: CPT

## 2020-07-10 PROCEDURE — G0463 HOSPITAL OUTPT CLINIC VISIT: HCPCS | Mod: ZF

## 2020-07-10 RX ORDER — INSULIN ASPART 100 [IU]/ML
INJECTION, SOLUTION INTRAVENOUS; SUBCUTANEOUS
Qty: 15 ML | Refills: 3 | Status: SHIPPED | OUTPATIENT
Start: 2020-07-10 | End: 2020-09-01

## 2020-07-10 ASSESSMENT — MIFFLIN-ST. JEOR: SCORE: 1440.7

## 2020-07-10 NOTE — PROGRESS NOTES
[facility]   Heart Center Fetal Consult Note    Patient:  Tanisha Valenzuela MRN:  [id number]   YOB: 1985 Age:  35 year old   Date of Visit:  7/10/2020 PCP:  Angelica Prince, CNM     Dear Dr. Ford:    I had the pleasure of seeing Tanisha Valenzuela at the [facility] on 7/10/2020 in fetal cardiology consultation for fetal echocardiogram results. She presented today accompanied by herself. As you know, she is a 35 year old  at 22w0d who presented for fetal echocardiogram today because of maternal type II diabetes mellitus.    I performed and interpreted the fetal echocardiogram today, which demonstrated normal fetal cardiac anatomy. Normal fetal intracardiac connections. Normal right and left ventricular size and function. Fetal heart rate is regular at 149 bpm. No hydrops.    I reviewed the echo findings today with Tanisha Valenzuela. She is aware that the study was within normal limits with no major cardiac abnormalities. She is aware of the general limitations of fetal echocardiography. No additional fetal echocardiograms are recommended. No  cardiac follow-up is required.     Thank you for allowing me to participate in Tanisha's care. Please do not hesitate to contact me with questions or concerns.    This visit was separate from the performance and interpretation of the ultrasound. The majority of the time (>50%) was spent in counseling and coordination of care. I spent approximately 10 minutes in face-to-face time reviewing the above considerations.    Felix Hutchins M.D.  Pediatric Cardiology  [address], 5th floor, [place] [address]  Phone [phone]  Fax [phone]

## 2020-07-10 NOTE — LETTER
"7/10/2020       RE: Tanisha Valenzuela  [address]  [facility] [address]     Dear Colleague,    Thank you for referring your patient, Tanisha Valenzuela, to the [facility] at [facility]. Please see a copy of my visit note below.    Subjective:     Highest 167 after dinner 2pp, lowest fasting BS 99 this week.  Insulin 4 units before breakfast, 5 before lunch, and 11 before dinner is what she is doing this week     35 year old  at 22w0d presents for a routine prenatal appointment.       no vaginal bleeding or leakage of fluid.  no contractions or cramping.    pos fetal movement.       No HA, visual changes, RUQ or epigastric pain.   The patient presents with the following concerns: BS hard to get into range   Level II US  Results reviewed normal scan.        Objective:  Vitals:    07/10/20 1306   BP: 109/71   BP Location: Left arm   Patient Position: Chair   Pulse: 87   Weight: 83.5 kg (184 lb 1.6 oz)   Height: 1.499 m (4' 11\")     See OB flowsheet    Assessment/Plan     Encounter Diagnoses   Name Primary?     High risk pregnancy, WHS MD care Yes     Class 2 severe obesity due to excess calories with serious comorbidity and body mass index (BMI) of 37.0 to 37.9 in adult (H)      PCOS (polycystic ovarian syndrome)      Type 2 diabetes mellitus with microalbuminuria, unspecified whether long term insulin use (H)      Vitamin D deficiency      Previous  section      No orders of the defined types were placed in this encounter.    No orders of the defined types were placed in this encounter.    Message sent to DM providers with BS info and reqeust to contact patient, she does have a DM appt in one week but BS abn now, all Fasting BS are high  - Reviewed total weight gain, encouraged continued healthy diet and exercise.      - Reviewed why/how to contact provider.    Patient education/orders or handouts today:  PTL signs/symptoms and " fetal echo today   Return to clinic in 2 weeks and prn if questions or concerns.   Sonya Bonilla, APRN CNM

## 2020-07-10 NOTE — NURSING NOTE
Chief Complaint   Patient presents with     Prenatal Care     22w0d       See ANY Cleary 7/10/2020

## 2020-07-10 NOTE — PROGRESS NOTES
"Subjective:     Highest 167 after dinner 2pp, lowest fasting BS 99 this week.  Insulin 4 units before breakfast, 5 before lunch, and 11 before dinner is what she is doing this week     35 year old  at 22w0d presents for a routine prenatal appointment.       no vaginal bleeding or leakage of fluid.  no contractions or cramping.    pos fetal movement.       No HA, visual changes, RUQ or epigastric pain.   The patient presents with the following concerns: BS hard to get into range   Level II US  Results reviewed normal scan.        Objective:  Vitals:    07/10/20 1306   BP: 109/71   BP Location: Left arm   Patient Position: Chair   Pulse: 87   Weight: 83.5 kg (184 lb 1.6 oz)   Height: 1.499 m (4' 11\")     See OB flowsheet    Assessment/Plan     Encounter Diagnoses   Name Primary?     High risk pregnancy, WHS MD care Yes     Class 2 severe obesity due to excess calories with serious comorbidity and body mass index (BMI) of 37.0 to 37.9 in adult (H)      PCOS (polycystic ovarian syndrome)      Type 2 diabetes mellitus with microalbuminuria, unspecified whether long term insulin use (H)      Vitamin D deficiency      Previous  section      No orders of the defined types were placed in this encounter.    No orders of the defined types were placed in this encounter.    Message sent to DM providers with BS info and reqeust to contact patient, she does have a DM appt in one week but BS abn now, all Fasting BS are high  - Reviewed total weight gain, encouraged continued healthy diet and exercise.      - Reviewed why/how to contact provider.    Patient education/orders or handouts today:  PTL signs/symptoms and fetal echo today   Return to clinic in 2 weeks and prn if questions or concerns.   Sonya Bonilla, APRN CNM                "

## 2020-07-13 ENCOUNTER — NURSE TRIAGE (OUTPATIENT)
Dept: NURSING | Facility: CLINIC | Age: 35
End: 2020-07-13

## 2020-07-13 ENCOUNTER — TELEPHONE (OUTPATIENT)
Dept: OBGYN | Facility: CLINIC | Age: 35
End: 2020-07-13

## 2020-07-13 NOTE — TELEPHONE ENCOUNTER
"Pregnant/ due 11/19/ has gestational diabetes/ calling today with concerns about her headache past 48 hours and sore throat past 24 hours/no fever/her employer wants a covid test/ no known exposure/ rates head pain as a \"8/10/occasional cough/ sent scheduling for a call back from her midwife with in 1 hour   Jose Conner RN -[phone]    Reason for Disposition    HIGH RISK patient (e.g., age > 64 years, diabetes, heart or lung disease, weak immune system)    Additional Information    Negative: SEVERE difficulty breathing (e.g., struggling for each breath, speaks in single words)    Negative: Difficult to awaken or acting confused (e.g., disoriented, slurred speech)    Negative: Bluish (or gray) lips or face now    Negative: Shock suspected (e.g., cold/pale/clammy skin, too weak to stand, low BP, rapid pulse)    Negative: Sounds like a life-threatening emergency to the triager    Negative: [1] COVID-19 exposure AND [2] no symptoms    Negative: COVID-19 and Breastfeeding, questions about    Negative: [1] Adult with possible COVID-19 symptoms AND [2] triager concerned about severity of symptoms or other causes    Negative: SEVERE or constant chest pain or pressure (Exception: mild central chest pain, present only when coughing)    Negative: MODERATE difficulty breathing (e.g., speaks in phrases, SOB even at rest, pulse 100-120)    Negative: Patient sounds very sick or weak to the triager    Negative: MILD difficulty breathing (e.g., minimal/no SOB at rest, SOB with walking, pulse <100)    Negative: Chest pain or pressure    Negative: Fever > 103 F (39.4 C)    Negative: [1] Fever > 101 F (38.3 C) AND [2] age > 60    Negative: [1] Fever > 100.0 F (37.8 C) AND [2] bedridden (e.g., nursing home patient, CVA, chronic illness, recovering from surgery)    Protocols used: CORONAVIRUS (COVID-19) DIAGNOSED OR [id number] 5.16.20 "

## 2020-07-13 NOTE — TELEPHONE ENCOUNTER
Received call from Tanisha Romano stating that since yesterday she has had a 'bad' headache and sore throat and is just not feeling good. Has occasional cough.  Denies fever, SOB, chest tightness, visual disturbance, RUQ pain, sudden increase in LE or hand swelling.    She reports her employer is requiring a covid test for her to return to work.    Advised she have visit with [email or web address].  If covid test cannot be done quickly, can go to [facility] for test.  Can take tylenol up to 1000mg every 6 hours for HA pain.  She indicated understanding and agreed with plan.

## 2020-07-17 ENCOUNTER — VIRTUAL VISIT (OUTPATIENT)
Dept: INTERPRETER SERVICES | Facility: CLINIC | Age: 35
End: 2020-07-17

## 2020-07-17 ENCOUNTER — VIRTUAL VISIT (OUTPATIENT)
Dept: ENDOCRINOLOGY | Facility: CLINIC | Age: 35
End: 2020-07-17
Payer: COMMERCIAL

## 2020-07-17 ENCOUNTER — APPOINTMENT (OUTPATIENT)
Dept: INTERPRETER SERVICES | Facility: CLINIC | Age: 35
End: 2020-07-17
Payer: COMMERCIAL

## 2020-07-17 DIAGNOSIS — R80.9 TYPE 2 DIABETES MELLITUS WITH MICROALBUMINURIA, UNSPECIFIED WHETHER LONG TERM INSULIN USE: Primary | ICD-10-CM

## 2020-07-17 DIAGNOSIS — E11.29 TYPE 2 DIABETES MELLITUS WITH MICROALBUMINURIA, UNSPECIFIED WHETHER LONG TERM INSULIN USE: Primary | ICD-10-CM

## 2020-07-17 NOTE — LETTER
"7/17/2020       RE: Tanisha Valenzuela  [address] [facility] [address]     Dear Colleague,    Thank you for referring your patient, Tanisha Valenzuela, to the [facility] ENDOCRINOLOGY at [facility]. Please see a copy of my visit note below.    Patients Glucose Data was Reached patient but they declined to share any data because meter in the car but bs is running normal.     Tanisha Valenzuela is a 35 year old female who is being evaluated via a billable video visit.      The patient has been notified of following:     \"This video visit will be conducted via a call between you and your physician/provider. We have found that certain health care needs can be provided without the need for an in-person physical exam.  This service lets us provide the care you need with a video conversation.  If a prescription is necessary we can send it directly to your pharmacy.  If lab work is needed we can place an order for that and you can then stop by our lab to have the test done at a later time.    Video visits are billed at different rates depending on your insurance coverage.  Please reach out to your insurance provider with any questions.    If during the course of the call the physician/provider feels a video visit is not appropriate, you will not be charged for this service.\"    Patient has given verbal consent for Video visit? Yes  How would you like to obtain your AVS? MyChart  If you are dropped from the video visit, the video invite should be resent to: Text to cell phone: [phone]  Will anyone else be joining your video visit? No      Due to the COVID 19 pandemic this visit was converted to a telephone in order to help prevent spread of infection in this high risk patient and the general population .      Start time: 415, stop time: start time at 4:27,2 minutes documentation time,  total time of 15 minutes   required. This is a telephone visit.    Tanisha" YING Valenzuela is a 34 year old female who is being evaluated via a billable telephone visit.      HPI  #1 type 2 diabetes  Per patient report, she is had diabetes since approximately 2017.  She has been on metformin since diagnosis.  She has been very intermittent with checking her blood sugar.  September 2019 hemoglobin A1c of 7.1 in March 2020 hemoglobin A1c of 6.2.  Currently she is on metformin at 1000 mg twice daily.    In April 2020, we started her on insulin. Pt now on levemir at 12-13 units daily at bedtime ( I had suggested around 15 units but pt has reduced by herself to 13 units) . Pt taking Novolog at 5 units with meals. Highest around 127 after eating. Also on metformin 1000 mg twice daily.     #2 pregnancy  Patient is currently pregnant.  She is approximately 23  weeks.  She anticipates a due date in November 2020. She reports a previous pregnancy (around 2004) for which she did not have gestational diabetes.    #3 positive COVID19 testing  Pt reports positive COVID19 testing as of 7/14.      Past Medical History  Past Medical History:   Diagnosis Date     BMI 37.0-37.9, adult      Hyperlipidemia      PCOS (polycystic ovarian syndrome)      Type 2 diabetes mellitus without complication, with long-term current use of insulin (H)        Allergies  No Known Allergies  Medications  Current Outpatient Medications   Medication Sig Dispense Refill     alcohol swab prep pads Use to swab area of injection/jose as directed. 100 each 3     Continuous Blood Gluc  (FREESTYLE VIRA 14 DAY READER) JAGDEEP Use to read blood sugars as per 's instructions. 1 each 0     Continuous Blood Gluc Sensor (FREESTYLE VIRA 14 DAY SENSOR) MISC Change every 14 days. 2 each 11     insulin aspart (NOVOLOG FLEXPEN) 100 UNIT/ML pen 5 units before breakfast, 5 units before lunch, 5 units before dinner 15 mL 3     insulin detemir (LEVEMIR PEN) 100 UNIT/ML pen Take 15 units daily at bedtime 15 mL 3     insulin pen  needle (ULTICARE MICRO) 32G X 4 MM miscellaneous Use 4  pen needles daily or as directed. 100 each 3     metFORMIN (GLUCOPHAGE) 1000 MG tablet Take 1 tablet (1,000 mg) by mouth 2 times daily (with meals) 90 tablet 3     Prenatal Vit-Fe Fumarate-FA (PRENATAL VITAMIN) 27-0.8 MG TABS Take 1 tablet by mouth daily       vitamin D3 (CHOLECALCIFEROL) 2000 units (50 mcg) tablet Take 2 tablets (4,000 Units) by mouth daily Take two tablets daily. 180 tablet 3     Family History  family history includes Diabetes in her maternal grandmother and mother.  Social History  Social History     Socioeconomic History     Marital status:      Spouse name: Zander Gil     Number of children: 1     Years of education: Not on file     Highest education level: Not on file   Occupational History     Not on file   Social Needs     Financial resource strain: Not on file     Food insecurity     Worry: Not on file     Inability: Not on file     Transportation needs     Medical: Not on file     Non-medical: Not on file   Tobacco Use     Smoking status: Former Smoker     Smokeless tobacco: Never Used   Substance and Sexual Activity     Alcohol use: Not Currently     Drug use: Never     Sexual activity: Yes     Partners: Male   Lifestyle     Physical activity     Days per week: Not on file     Minutes per session: Not on file     Stress: Not on file   Relationships     Social connections     Talks on phone: Not on file     Gets together: Not on file     Attends Yazidism service: Not on file     Active member of club or organization: Not on file     Attends meetings of clubs or organizations: Not on file     Relationship status: Not on file     Intimate partner violence     Fear of current or ex partner: Not on file     Emotionally abused: Not on file     Physically abused: Not on file     Forced sexual activity: Not on file   Other Topics Concern     Not on file   Social History Narrative    How much exercise per week? Daily activites     How much calcium per day? In prenatals        How much caffeine per day? Not every day  23 week    How much vitamin D per day? In prentals    Do you/your family wear seatbelts?  Yes    Do you/your family use safety helmets? Yes    Do you/your family use sunscreen? Yes    Do you/your family keep firearms in the home? No    Do you/your family have a smoke detector(s)? Yes        [facility]-           ROS  Per HPI, otherwise review of symptoms was unremarkable      Physical Exam  LMP 02/01/2020   There is no height or weight on file to calculate BMI.    Vital signs and physical exam not available.  However the patient reports feeling well.  Psych: Alert and oriented times 3; coherent speech, normal   rate and volume, able to articulate logical thoughts, able   to abstract reason, no tangential thoughts, no hallucinations   or delusions        RESULTS  No visits with results within 1 Week(s) from this visit.   Latest known visit with results is:   Office Visit on 05/01/2020   Component Date Value Ref Range Status     Protein Random Urine 05/01/2020 0.32  g/L Final     Protein Total Urine g/gr Creatinine 05/01/2020 0.22* 0 - 0.2 g/g Cr Final     Sodium 05/01/2020 138  133 - 144 mmol/L Final     Potassium 05/01/2020 3.3* 3.4 - 5.3 mmol/L Final     Chloride 05/01/2020 108  94 - 109 mmol/L Final     Carbon Dioxide 05/01/2020 20  20 - 32 mmol/L Final     Anion Gap 05/01/2020 10  3 - 14 mmol/L Final     Glucose 05/01/2020 125* 70 - 99 mg/dL Final     Urea Nitrogen 05/01/2020 5* 7 - 30 mg/dL Final     Creatinine 05/01/2020 0.51* 0.52 - 1.04 mg/dL Final     GFR Estimate 05/01/2020 >90  >60 mL/min/[1.73_m2] Final    Comment: Non  GFR Calc  Starting 12/18/2018, serum creatinine based estimated GFR (eGFR) will be   calculated using the Chronic Kidney Disease Epidemiology Collaboration   (CKD-EPI) equation.       GFR Estimate If Black 05/01/2020 >90  >60 mL/min/[1.73_m2] Final    Comment:   American GFR Calc  Starting 12/18/2018, serum creatinine based estimated GFR (eGFR) will be   calculated using the Chronic Kidney Disease Epidemiology Collaboration   (CKD-EPI) equation.       Calcium 05/01/2020 9.2  8.5 - 10.1 mg/dL Final     Bilirubin Total 05/01/2020 0.3  0.2 - 1.3 mg/dL Final     Albumin 05/01/2020 3.2* 3.4 - 5.0 g/dL Final     Protein Total 05/01/2020 7.6  6.8 - 8.8 g/dL Final     Alkaline Phosphatase 05/01/2020 44  40 - 150 U/L Final     ALT 05/01/2020 27  0 - 50 U/L Final     AST 05/01/2020 15  0 - 45 U/L Final     Creatinine Urine 05/01/2020 144  mg/dL Final       ASSESSMENT:    #1 Type 2 diabetes  Pt to continue with levemir at 13 units daily for now - she can increase by 1 unit levemir if her fasting sugars > 95 for 3 days, also to increase her novolog to 6 units with meal if her postprandial glucose >130 for 3 days    #2 pregnancy  Currently 23 week, Continue with OB follow-up.    #3 history of vitamin D deficiency  Patient currently on vitamin D at 4000 IU daily.    #4  positive COVID19 testing  Manage symptoms for now - pt ok to take tylenol (no more than 1000 mg daily - hx of normal LFTS in May 2020).     Will contact pt in 2 weeks to assess progress.    Start time: 415, stop time: start time at 4:27,2 minutes documentation time,  total time of 15 minutes       Again, thank you for allowing me to participate in the care of your patient.      Sincerely,    Theresa Tejada MD

## 2020-07-17 NOTE — PATIENT INSTRUCTIONS
Let us know if fasting blood sugars >95 or after eating blood sugars <130    Pt to do labs in 1 month for her 3 month measure of her blood sugar

## 2020-07-17 NOTE — PROGRESS NOTES
"Patients Glucose Data was Reached patient but they declined to share any data because meter in the car but bs is running normal.     Tanisha Valenzuela is a 35 year old female who is being evaluated via a billable video visit.      The patient has been notified of following:     \"This video visit will be conducted via a call between you and your physician/provider. We have found that certain health care needs can be provided without the need for an in-person physical exam.  This service lets us provide the care you need with a video conversation.  If a prescription is necessary we can send it directly to your pharmacy.  If lab work is needed we can place an order for that and you can then stop by our lab to have the test done at a later time.    Video visits are billed at different rates depending on your insurance coverage.  Please reach out to your insurance provider with any questions.    If during the course of the call the physician/provider feels a video visit is not appropriate, you will not be charged for this service.\"    Patient has given verbal consent for Video visit? Yes  How would you like to obtain your AVS? MyChart  If you are dropped from the video visit, the video invite should be resent to: Text to cell phone: [phone]  Will anyone else be joining your video visit? No      Due to the COVID 19 pandemic this visit was converted to a telephone in order to help prevent spread of infection in this high risk patient and the general population .      Start time: 415, stop time: start time at 4:27,2 minutes documentation time,  total time of 15 minutes   required. This is a telephone visit.    Tanisha Valenzuela is a 34 year old female who is being evaluated via a billable telephone visit.      HPI  #1 type 2 diabetes  Per patient report, she is had diabetes since approximately 2017.  She has been on metformin since diagnosis.  She has been very intermittent with checking her blood " sugar.  September 2019 hemoglobin A1c of 7.1 in March 2020 hemoglobin A1c of 6.2.  Currently she is on metformin at 1000 mg twice daily.    In April 2020, we started her on insulin. Pt now on levemir at 12-13 units daily at bedtime ( I had suggested around 15 units but pt has reduced by herself to 13 units) . Pt taking Novolog at 5 units with meals. Highest around 127 after eating. Also on metformin 1000 mg twice daily.     #2 pregnancy  Patient is currently pregnant.  She is approximately 23  weeks.  She anticipates a due date in November 2020. She reports a previous pregnancy (around 2004) for which she did not have gestational diabetes.    #3 positive COVID19 testing  Pt reports positive COVID19 testing as of 7/14.      Past Medical History  Past Medical History:   Diagnosis Date     BMI 37.0-37.9, adult      Hyperlipidemia      PCOS (polycystic ovarian syndrome)      Type 2 diabetes mellitus without complication, with long-term current use of insulin (H)        Allergies  No Known Allergies  Medications  Current Outpatient Medications   Medication Sig Dispense Refill     alcohol swab prep pads Use to swab area of injection/jose as directed. 100 each 3     Continuous Blood Gluc  (FREESTYLE VIRA 14 DAY READER) JAGDEEP Use to read blood sugars as per 's instructions. 1 each 0     Continuous Blood Gluc Sensor (FREESTYLE VIRA 14 DAY SENSOR) MISC Change every 14 days. 2 each 11     insulin aspart (NOVOLOG FLEXPEN) 100 UNIT/ML pen 5 units before breakfast, 5 units before lunch, 5 units before dinner 15 mL 3     insulin detemir (LEVEMIR PEN) 100 UNIT/ML pen Take 15 units daily at bedtime 15 mL 3     insulin pen needle (ULTICARE MICRO) 32G X 4 MM miscellaneous Use 4  pen needles daily or as directed. 100 each 3     metFORMIN (GLUCOPHAGE) 1000 MG tablet Take 1 tablet (1,000 mg) by mouth 2 times daily (with meals) 90 tablet 3     Prenatal Vit-Fe Fumarate-FA (PRENATAL VITAMIN) 27-0.8 MG TABS Take 1  tablet by mouth daily       vitamin D3 (CHOLECALCIFEROL) 2000 units (50 mcg) tablet Take 2 tablets (4,000 Units) by mouth daily Take two tablets daily. 180 tablet 3     Family History  family history includes Diabetes in her maternal grandmother and mother.  Social History  Social History     Socioeconomic History     Marital status:      Spouse name: Zander Gil     Number of children: 1     Years of education: Not on file     Highest education level: Not on file   Occupational History     Not on file   Social Needs     Financial resource strain: Not on file     Food insecurity     Worry: Not on file     Inability: Not on file     Transportation needs     Medical: Not on file     Non-medical: Not on file   Tobacco Use     Smoking status: Former Smoker     Smokeless tobacco: Never Used   Substance and Sexual Activity     Alcohol use: Not Currently     Drug use: Never     Sexual activity: Yes     Partners: Male   Lifestyle     Physical activity     Days per week: Not on file     Minutes per session: Not on file     Stress: Not on file   Relationships     Social connections     Talks on phone: Not on file     Gets together: Not on file     Attends Spiritism service: Not on file     Active member of club or organization: Not on file     Attends meetings of clubs or organizations: Not on file     Relationship status: Not on file     Intimate partner violence     Fear of current or ex partner: Not on file     Emotionally abused: Not on file     Physically abused: Not on file     Forced sexual activity: Not on file   Other Topics Concern     Not on file   Social History Narrative    How much exercise per week? Daily activites    How much calcium per day? In prenatals        How much caffeine per day? Not every day  23 week    How much vitamin D per day? In prentals    Do you/your family wear seatbelts?  Yes    Do you/your family use safety helmets? Yes    Do you/your family use sunscreen? Yes    Do you/your family  keep firearms in the home? No    Do you/your family have a smoke detector(s)? Yes        Reviewed [facility] lpn 3-           ROS  Per HPI, otherwise review of symptoms was unremarkable      Physical Exam  LMP 02/01/2020   There is no height or weight on file to calculate BMI.    Vital signs and physical exam not available.  However the patient reports feeling well.  Psych: Alert and oriented times 3; coherent speech, normal   rate and volume, able to articulate logical thoughts, able   to abstract reason, no tangential thoughts, no hallucinations   or delusions        RESULTS  No visits with results within 1 Week(s) from this visit.   Latest known visit with results is:   Office Visit on 05/01/2020   Component Date Value Ref Range Status     Protein Random Urine 05/01/2020 0.32  g/L Final     Protein Total Urine g/gr Creatinine 05/01/2020 0.22* 0 - 0.2 g/g Cr Final     Sodium 05/01/2020 138  133 - 144 mmol/L Final     Potassium 05/01/2020 3.3* 3.4 - 5.3 mmol/L Final     Chloride 05/01/2020 108  94 - 109 mmol/L Final     Carbon Dioxide 05/01/2020 20  20 - 32 mmol/L Final     Anion Gap 05/01/2020 10  3 - 14 mmol/L Final     Glucose 05/01/2020 125* 70 - 99 mg/dL Final     Urea Nitrogen 05/01/2020 5* 7 - 30 mg/dL Final     Creatinine 05/01/2020 0.51* 0.52 - 1.04 mg/dL Final     GFR Estimate 05/01/2020 >90  >60 mL/min/[1.73_m2] Final    Comment: Non  GFR Calc  Starting 12/18/2018, serum creatinine based estimated GFR (eGFR) will be   calculated using the Chronic Kidney Disease Epidemiology Collaboration   (CKD-EPI) equation.       GFR Estimate If Black 05/01/2020 >90  >60 mL/min/[1.73_m2] Final    Comment:  GFR Calc  Starting 12/18/2018, serum creatinine based estimated GFR (eGFR) will be   calculated using the Chronic Kidney Disease Epidemiology Collaboration   (CKD-EPI) equation.       Calcium 05/01/2020 9.2  8.5 - 10.1 mg/dL Final     Bilirubin Total 05/01/2020 0.3  0.2 - 1.3 mg/dL  Final     Albumin 05/01/2020 3.2* 3.4 - 5.0 g/dL Final     Protein Total 05/01/2020 7.6  6.8 - 8.8 g/dL Final     Alkaline Phosphatase 05/01/2020 44  40 - 150 U/L Final     ALT 05/01/2020 27  0 - 50 U/L Final     AST 05/01/2020 15  0 - 45 U/L Final     Creatinine Urine 05/01/2020 144  mg/dL Final       ASSESSMENT:    #1 Type 2 diabetes  Pt to continue with levemir at 13 units daily for now - she can increase by 1 unit levemir if her fasting sugars > 95 for 3 days, also to increase her novolog to 6 units with meal if her postprandial glucose >130 for 3 days    #2 pregnancy  Currently 23 week, Continue with OB follow-up.    #3 history of vitamin D deficiency  Patient currently on vitamin D at 4000 IU daily.    #4  positive COVID19 testing  Manage symptoms for now - pt ok to take tylenol (no more than 1000 mg daily - hx of normal LFTS in May 2020).     Will contact pt in 2 weeks to assess progress.    Start time: 415, stop time: start time at 4:27,2 minutes documentation time,  total time of 15 minutes

## 2020-08-25 NOTE — PROGRESS NOTES
[facility]  Return OB Visit    S: The patient wants to discuss her diabetes today. She takes levemir insulin 15U at night and novolog 6U before each meal. She did not bring in her BG meter and just started testing consistently last Thursday. Her fasting BG is 105-113. 2hr postprandial after breakfast and lunch is usually 110-120, after breakfast this morning it was 91. After dinner is when she sees the highest reading, usually 130-140s, highest in the 150s. Dinner is her largest meal.     She reports that she had COVID-19 in July. She had symptoms of fatigue, headaches, loss of smell. She tested positive for COVID on 7/13/2020. Her family was also tested and was negative. Her symptoms are now resolved and she feels well.    She has had increased vaginal discharge over the last 2 months, no itching or burning. She has intermittent HA, about 2-3x per week that resolve without any medications. She had one episode of about 1hr of LLQ pain last week that she thinks was because her baby kicked her in that area, it resolved spontaneously. She has a small amount of ankle swelling after being on her feet a lot. Denies vaginal bleeding, LOF, contractions.  Reports good fetal movement.      Pregnancy notable for  Type 2 diabetes  Positive COVID19 test on 7/13/20 (symptomatic starting 7/11: cough, sore throat, congestion, rhinorrhea, HA, body aches)  Hx macrosomia (10lb)  Hx CS in 2004      O: /72   Pulse 89   Wt 85.3 kg (188 lb)   LMP 02/01/2020   Breastfeeding No   BMI 37.97 kg/m    Weight gain:     Gen: Well-appearing, NAD  CV: Well perfused  Resp:  Breathing comfortably on room air, no cough  Ext:  Trace LE edema  Skin: Infraumbilical VML scar    Fundal Height:  31cm  FHR: 140    Imaging:  Fetal echo 7/10/2020  Normal fetal cardiac anatomy. Normal fetal intracardiac connections. Normal right and left ventricular size and function. Fetal heart rate is regular at 149 bpm. No hydrops. No additional fetal  echocardiograms are recommended.      A/P:  Tanisha Valenzuela is a 35 year old  at 28w5d by 7w3d US, here for return OB visit.    1. Type II DM  - has been seen by endocrinology (Dr. Tejada, last virtual visit ), plan for her to follow up with them again, referral placed  - counseled patient on checking her BG every day, fasting and 2hrs after meals.  - fasting BG has been above goal, increase levemir to 15U daily  - 2hr postprandial BG: meeting goal of <120 after breakfast and lunch, above goal at dinner, so will increase dinnertime insulin. Novolog 6U with breakfast and lunch, 8U with dinner  - referral to diabetes nurse educator placed today  - plan for appointment within 1 week with Dr. Lopez to check in about BG on new insulin dose  - per MFM, growth US q4w (last 20), growth US ordered today, patient to schedule in the next 2 weeks  -  testing at least weekly starting at 32w per MFM  - normal fetal echo on 7/10/2020  - on ASA 81mg daily  - started to discuss delivery planning, she desires a TOLAC. Discussed IOL for diabetes likely at 37-38 weeks due to somewhat poor diabetes control. Discussed the possibility of failure of TOLAC possibly due to fetal macrosomia, and that in that situation would require a CS. She expressed understanding.     2. History of   - per patient, occurred in [place], was for failure to dilate past 1cm. Infraumbilical VML incision, she is unsure what the uterine incision is. She tried to get the records but was not able to. She was not told that she couldn't have a vaginal delivery.  - The patient desires a TOLAC    3. PNC:   - Prenatal labs normal, baseline HELLP labs normal, Rh pos, Rubella immune  - genetics: declined  - Imaging: level II US without abnormalities, normal fetal echo  - taking PNV, refill sent today  - Immunizations: due for Tdap, patient left before getting tdap today, plan to give at next visit    Return to clinic in 2 weeks.  Discussed return precautions.    Staffed with Dr. Teddy Vegas MD  OB/GYN PGY-1  08/25/20 1:16 PM    OBGYN Attending Addendum    Tanisha Valenzuela was seen by the resident, Dr. Vegas, in [facility]. I, Mary Espinal, supervised all aspects of this visit, including the history and physical exam. The assessment and plan were made jointly between myself and Dr. Vegas. I have edited the note where necessary to reflect my assessment and agree with the above documentation.    Mary Espinal MD, MSCI    [facility]/OBGYN

## 2020-08-26 ENCOUNTER — OFFICE VISIT (OUTPATIENT)
Dept: OBGYN | Facility: CLINIC | Age: 35
End: 2020-08-26
Payer: COMMERCIAL

## 2020-08-26 VITALS
BODY MASS INDEX: 37.97 KG/M2 | HEART RATE: 89 BPM | SYSTOLIC BLOOD PRESSURE: 116 MMHG | DIASTOLIC BLOOD PRESSURE: 72 MMHG | WEIGHT: 188 LBS

## 2020-08-26 DIAGNOSIS — E11.9 TYPE 2 DIABETES MELLITUS WITHOUT COMPLICATION, WITHOUT LONG-TERM CURRENT USE OF INSULIN (H): Primary | ICD-10-CM

## 2020-08-26 DIAGNOSIS — O09.90 HIGH-RISK PREGNANCY, UNSPECIFIED TRIMESTER: ICD-10-CM

## 2020-08-26 DIAGNOSIS — O24.111 PRE-EXISTING TYPE 2 DIABETES MELLITUS DURING PREGNANCY IN FIRST TRIMESTER: ICD-10-CM

## 2020-08-26 RX ORDER — PNV NO.95/FERROUS FUM/FOLIC AC 28MG-0.8MG
1 TABLET ORAL DAILY
Qty: 90 TABLET | Refills: 3 | Status: SHIPPED | OUTPATIENT
Start: 2020-08-26

## 2020-08-26 ASSESSMENT — PAIN SCALES - GENERAL: PAINLEVEL: NO PAIN (0)

## 2020-09-01 ENCOUNTER — VIRTUAL VISIT (OUTPATIENT)
Dept: PHARMACY | Facility: CLINIC | Age: 35
End: 2020-09-01
Payer: COMMERCIAL

## 2020-09-01 DIAGNOSIS — O24.111 PRE-EXISTING TYPE 2 DIABETES MELLITUS DURING PREGNANCY IN FIRST TRIMESTER: ICD-10-CM

## 2020-09-01 DIAGNOSIS — O09.90 HIGH RISK PREGNANCY, ANTEPARTUM: Primary | ICD-10-CM

## 2020-09-01 PROCEDURE — 99605 MTMS BY PHARM NP 15 MIN: CPT | Mod: TEL | Performed by: PHARMACIST

## 2020-09-01 PROCEDURE — 99607 MTMS BY PHARM ADDL 15 MIN: CPT | Mod: TEL | Performed by: PHARMACIST

## 2020-09-01 RX ORDER — INSULIN ASPART 100 [IU]/ML
INJECTION, SOLUTION INTRAVENOUS; SUBCUTANEOUS
Qty: 15 ML | Refills: 3 | Status: SHIPPED | OUTPATIENT
Start: 2020-09-01 | End: 2020-10-13

## 2020-09-01 NOTE — PROGRESS NOTES
MTM ENCOUNTER  SUBJECTIVE/OBJECTIVE:                           Tanisha Valenzuela is a 35 year old female called for an initial visit. She was referred to me from Dr. Vegas/Dr. Espinal.   was present during today's visit.     Chief Complaint: diabetes in pregnancy.    Patient consented to a telehealth visit: yes  Telemedicine Visit Details  Type of service:  Telephone visit  Start Time: 3:02 PM  End Time: 3:24 PM  Originating Location (pt. Location): Home  Distant Location (provider location):  [facility]  Mode of Communication:  Telephone    Allergies/ADRs: Reviewed in EHR  Tobacco:  reports that she has quit smoking. She has never used smokeless tobacco.  Alcohol: not currently using  PMH: Reviewed in EHR    Medication Adherence/Access: no issues reported    Type 2 Diabetes in Pregnancy: Tanisha Valenzuela is a 35 year old  at 29w4d for follow-up of diabetes in pregnancy for insulin adjustment.  Current therapy:   levemir 15 units once daily , novolog 6 units prior to breakfast & lunch, 8 units prior to dinner.  She is also on metformin 1000 mg BID.    She is checking her blood sugars regularly, including 2-hours post-prandial.    Blood sugars are as follows: (complete table or insert clinical media)  Is at work today;  Doesn't have meter/log with her, but has recorded numbers in her phone.  She reports:    Date Fasting Post-Breakfast Post-Lunch Post-Dinner   Sat () [phone] (forgot detemir  insulin this night -- only time this happened)   Sun () 95 [phone]   Mon () 90 121 118 123   Tues () 93          Pregnancy/Supplements: currently on prenatal vitamin, D3 4000 units, and aspirin 81 mg daily for preclampsia prevention    Today's Vitals: LMP 2020       ASSESSMENT:              Current medications were reviewed today as discussed above.      Medication Adherence: good, no issues identified    Type 2 diabetes in pregnancy: Tanisha  YING Valenzuela is a 35 year old  at 29w4d for follow-up of diabetes in pregnancy for insulin adjustment.  After reviewing her blood sugars, less than 50% of post-prandial readings are at target, while 100% of fasting readings are at goal.   Based on this, I recommend Adjustment of Novolog insulin therapy.   Pregnancy: Current supplements appropriate;  Continue ob care     PLAN:                    Continue dietary modifications, regular exercise as able  Continue to check blood glucose 4x daily  Bring glucose log to all appointments  Needs 2-hr GTT at 6 wks post-partum  Increase Novolog to lunch from 6 to 8 units, and dinner from 8 to 10 units.  Keep breakfast at 6 units, and continue levemir at 15 units at bedtime.  Patient confirmed dose changes by repeating back to provider.      I spent 22 minutes with this patient today. All changes were made via collaborative practice agreement with Dr. Espinal . A copy of the visit note was provided to the patient's referring provider.    Will follow up in 1 week;  Patient was referred back to endocrinology by OB, so will follow patient until those appointments are able to be scheduled.      Mikayla Lopez, Pharm.D., BCPS

## 2020-09-02 RX ORDER — ASPIRIN 81 MG/1
81 TABLET ORAL DAILY
COMMUNITY

## 2020-09-09 ENCOUNTER — ANCILLARY PROCEDURE (OUTPATIENT)
Dept: ULTRASOUND IMAGING | Facility: CLINIC | Age: 35
End: 2020-09-09
Payer: COMMERCIAL

## 2020-09-09 ENCOUNTER — OFFICE VISIT (OUTPATIENT)
Dept: OBGYN | Facility: CLINIC | Age: 35
End: 2020-09-09
Payer: COMMERCIAL

## 2020-09-09 VITALS
SYSTOLIC BLOOD PRESSURE: 105 MMHG | HEIGHT: 59 IN | HEART RATE: 73 BPM | WEIGHT: 187.8 LBS | BODY MASS INDEX: 37.86 KG/M2 | DIASTOLIC BLOOD PRESSURE: 64 MMHG

## 2020-09-09 DIAGNOSIS — E11.9 TYPE 2 DIABETES MELLITUS WITHOUT COMPLICATION, WITHOUT LONG-TERM CURRENT USE OF INSULIN (H): ICD-10-CM

## 2020-09-09 DIAGNOSIS — O09.90 HIGH-RISK PREGNANCY, UNSPECIFIED TRIMESTER: ICD-10-CM

## 2020-09-09 DIAGNOSIS — L91.8 SKIN TAG: ICD-10-CM

## 2020-09-09 DIAGNOSIS — O09.892 SUPERVISION OF OTHER HIGH RISK PREGNANCIES, SECOND TRIMESTER: Primary | ICD-10-CM

## 2020-09-09 PROCEDURE — G0463 HOSPITAL OUTPT CLINIC VISIT: HCPCS | Mod: 25,ZF

## 2020-09-09 PROCEDURE — 76816 OB US FOLLOW-UP PER FETUS: CPT

## 2020-09-09 PROCEDURE — 90715 TDAP VACCINE 7 YRS/> IM: CPT | Mod: ZF

## 2020-09-09 PROCEDURE — 25000128 H RX IP 250 OP 636: Mod: ZF

## 2020-09-09 PROCEDURE — 90471 IMMUNIZATION ADMIN: CPT | Mod: ZF

## 2020-09-09 ASSESSMENT — MIFFLIN-ST. JEOR: SCORE: 1457.49

## 2020-09-18 ENCOUNTER — HOSPITAL ENCOUNTER (OUTPATIENT)
Facility: CLINIC | Age: 35
Discharge: HOME | End: 2020-09-18
Attending: OBSTETRICS & GYNECOLOGY | Admitting: OBSTETRICS & GYNECOLOGY
Payer: COMMERCIAL

## 2020-09-18 ENCOUNTER — HOSPITAL ENCOUNTER (OUTPATIENT)
Facility: CLINIC | Age: 35
End: 2020-09-18
Attending: OBSTETRICS & GYNECOLOGY | Admitting: OBSTETRICS & GYNECOLOGY
Payer: COMMERCIAL

## 2020-09-18 VITALS
RESPIRATION RATE: 20 BRPM | SYSTOLIC BLOOD PRESSURE: 108 MMHG | HEART RATE: 76 BPM | DIASTOLIC BLOOD PRESSURE: 61 MMHG | BODY MASS INDEX: 37.87 KG/M2 | WEIGHT: 187.83 LBS | TEMPERATURE: 98.6 F | HEIGHT: 59 IN

## 2020-09-18 PROBLEM — Z36.89 ENCOUNTER FOR TRIAGE IN PREGNANT PATIENT: Status: ACTIVE | Noted: 2020-09-18

## 2020-09-18 LAB
ALBUMIN UR-MCNC: NEGATIVE MG/DL
APPEARANCE UR: CLEAR
BACTERIA #/AREA URNS HPF: ABNORMAL /HPF
BILIRUB UR QL STRIP: NEGATIVE
COLOR UR AUTO: ABNORMAL
GLUCOSE BLDC GLUCOMTR-MCNC: 84 MG/DL (ref 70–99)
GLUCOSE UR STRIP-MCNC: 30 MG/DL
HGB UR QL STRIP: NEGATIVE
KETONES UR STRIP-MCNC: NEGATIVE MG/DL
LEUKOCYTE ESTERASE UR QL STRIP: ABNORMAL
NITRATE UR QL: NEGATIVE
PH UR STRIP: 7 PH (ref 5–7)
RBC #/AREA URNS AUTO: 5 /HPF (ref 0–2)
RUPTURE OF FETAL MEMBRANES BY ROM PLUS: NEGATIVE
SOURCE: ABNORMAL
SP GR UR STRIP: 1 (ref 1–1.03)
SPECIMEN SOURCE: ABNORMAL
SQUAMOUS #/AREA URNS AUTO: 1 /HPF (ref 0–1)
TRANS CELLS #/AREA URNS HPF: 1 /HPF (ref 0–1)
UROBILINOGEN UR STRIP-MCNC: NORMAL MG/DL (ref 0–2)
WBC #/AREA URNS AUTO: 3 /HPF (ref 0–5)
WET PREP SPEC: ABNORMAL

## 2020-09-18 PROCEDURE — G0463 HOSPITAL OUTPT CLINIC VISIT: HCPCS | Mod: 25

## 2020-09-18 PROCEDURE — 87491 CHLMYD TRACH DNA AMP PROBE: CPT | Performed by: STUDENT IN AN ORGANIZED HEALTH CARE EDUCATION/TRAINING PROGRAM

## 2020-09-18 PROCEDURE — 87210 SMEAR WET MOUNT SALINE/INK: CPT | Performed by: STUDENT IN AN ORGANIZED HEALTH CARE EDUCATION/TRAINING PROGRAM

## 2020-09-18 PROCEDURE — 87591 N.GONORRHOEAE DNA AMP PROB: CPT | Performed by: STUDENT IN AN ORGANIZED HEALTH CARE EDUCATION/TRAINING PROGRAM

## 2020-09-18 PROCEDURE — 59025 FETAL NON-STRESS TEST: CPT

## 2020-09-18 PROCEDURE — 81001 URINALYSIS AUTO W/SCOPE: CPT | Performed by: OBSTETRICS & GYNECOLOGY

## 2020-09-18 PROCEDURE — 25000132 ZZH RX MED GY IP 250 OP 250 PS 637: Performed by: STUDENT IN AN ORGANIZED HEALTH CARE EDUCATION/TRAINING PROGRAM

## 2020-09-18 PROCEDURE — 82962 GLUCOSE BLOOD TEST: CPT

## 2020-09-18 PROCEDURE — 84112 EVAL AMNIOTIC FLUID PROTEIN: CPT | Performed by: OBSTETRICS & GYNECOLOGY

## 2020-09-18 PROCEDURE — 87086 URINE CULTURE/COLONY COUNT: CPT | Performed by: OBSTETRICS & GYNECOLOGY

## 2020-09-18 PROCEDURE — 40000268 ZZH STATISTIC NO CHARGES

## 2020-09-18 RX ORDER — OXYTOCIN/0.9 % SODIUM CHLORIDE 30/500 ML
PLASTIC BAG, INJECTION (ML) INTRAVENOUS
Status: DISCONTINUED
Start: 2020-09-18 | End: 2020-09-18 | Stop reason: WASHOUT

## 2020-09-18 RX ORDER — LIDOCAINE HYDROCHLORIDE 10 MG/ML
INJECTION, SOLUTION EPIDURAL; INFILTRATION; INTRACAUDAL; PERINEURAL
Status: DISCONTINUED
Start: 2020-09-18 | End: 2020-09-18 | Stop reason: WASHOUT

## 2020-09-18 RX ORDER — OXYTOCIN 10 [USP'U]/ML
INJECTION, SOLUTION INTRAMUSCULAR; INTRAVENOUS
Status: DISCONTINUED
Start: 2020-09-18 | End: 2020-09-18 | Stop reason: WASHOUT

## 2020-09-18 RX ORDER — FLUCONAZOLE 150 MG/1
150 TABLET ORAL ONCE
Status: COMPLETED | OUTPATIENT
Start: 2020-09-18 | End: 2020-09-18

## 2020-09-18 RX ORDER — MISOPROSTOL 200 UG/1
TABLET ORAL
Status: DISCONTINUED
Start: 2020-09-18 | End: 2020-09-18 | Stop reason: HOSPADM

## 2020-09-18 RX ORDER — SODIUM CHLORIDE, SODIUM LACTATE, POTASSIUM CHLORIDE, CALCIUM CHLORIDE 600; 310; 30; 20 MG/100ML; MG/100ML; MG/100ML; MG/100ML
INJECTION, SOLUTION INTRAVENOUS
Status: DISCONTINUED
Start: 2020-09-18 | End: 2020-09-18 | Stop reason: WASHOUT

## 2020-09-18 RX ADMIN — FLUCONAZOLE 150 MG: 150 TABLET ORAL at 12:35

## 2020-09-18 ASSESSMENT — MIFFLIN-ST. JEOR: SCORE: 1457.88

## 2020-09-18 NOTE — PLAN OF CARE
Data: [facility].   Reason for maternal/fetal assessment per patient is Rule Out Labor (feels water broke 8 month)  . Patient is a . Prenatal record reviewed.      OB History    Para Term  AB Living   2 1 1 0 0 1   SAB TAB Ectopic Multiple Live Births   0 0 0 0 1      # Outcome Date GA Lbr Abdifatah/2nd Weight Sex Delivery Anes PTL Lv   2 Current            1 Term 04 41w0d  4.536 kg (10 lb) F CS-Classical EPI N CELESTE      Complications: Failure to Progress in Second Stage      Name: Tanisha Romano      Obstetric Comments   Patient reports that she is certain uterine incision is classical.  Denies DM with first pregnancy.  No HTN, needed blood transfusion after surgery.      Medical History:   Past Medical History:   Diagnosis Date    BMI 37.0-37.9, adult     Hyperlipidemia     PCOS (polycystic ovarian syndrome)     Type 2 diabetes mellitus without complication, with long-term current use of insulin (H)    . Gestational Age 32w0d. VSS. Cervix: not examined.  Fetal movement present. Patient denies cramping, backache,  pelvic pressure, UTI symptoms, GI problems, bloody show, vaginal bleeding, edema, headache, visual disturbances, epigastric or URQ pain, abdominal pain. Support persons are present, her partner Zander.  Action: Verbal consent for EFM. Triage assessment completed. EFM applied for NST. ROM+ was negative. GC/Clym, UA/UC sent. Uterine assessment reveal no contractions. Fetal assessment: Presumed adequate fetal oxygenation documented (see flow record). Patient education pamphlets given on fetal movement counts and signs of labor. Patient instructed to report change in fetal movement, vaginal leaking of fluid or bleeding, abdominal pain, or any concerns related to the pregnancy to her nurse/physician.   Response: Dr. Hernández informed of arrival. Plan per provider is discharge home. Patient verbalized understanding of education and verbalized agreement with plan.

## 2020-09-18 NOTE — PROGRESS NOTES
"Obstetrics Triage Note  CC: Thick white/yellow vaginal discharge     HPI:  Tanisha Valenzuela is a 35 year old  at 32w0d by LMP c/w 7w3d US who presents for evaluation of thick white/yellow vaginal discharge. The discharge began this morning around 0500 as a thick white, non-bloody discharge. She denies any associated pain or itching. It continued throughout the morning and became more yellow. The patient states that she has had thick white discharge during much of her pregnancy but it is not usually yellow.     She denies ctx, vaginal bleeding, or LOF. Normal fetal movement. She is otherwise feeling well and denies fevers, h/a, vision change, c/p, sob, epigastric or RUQ pain, n/v, worsening edema, urinary sx, or constipation.     Pregnancy complicated by:  - Type 2 DM on insulin and Metformin  - Hx of c/s x1 in [place] with unknown scar  - Obesity, BMI 37    ROS:  Negative except as mentioned in HPI.    PMH:  Past Medical History:   Diagnosis Date     BMI 37.0-37.9, adult      Hyperlipidemia      PCOS (polycystic ovarian syndrome)      Type 2 diabetes mellitus without complication, with long-term current use of insulin (H)        PSHx:   section    Medications:  No current facility-administered medications for this encounter.        Allergies:   No Known Allergies    Physical Exam:   Vitals:    20 0900 20 1000   BP:  108/61   Pulse: 76 76   Resp:  20   Temp: 98.6  F (37  C) 98.6  F (37  C)   TempSrc: Oral Oral   Weight: 85.2 kg (187 lb 13.3 oz)    Height: 1.499 m (4' 11.02\")       Gen: resting comfortably, in NAD  CV: Regular rate and rhythm, no murmurs  Pulm: CTAB, no increased work of breathing  Abd: soft, gravid, non-tender, non-distended  SSE: Thick white/yellow discharge seen via speculum exam covering the cervix and vagina. No blood or pooling of fluid. Cervix appears closed.      NST:  FHT: , moderate variability, +accelerations, no decelerations  [place]: Quiet     Labs:  - " Wet prep +yeast  - Gonorrhea and chlamydia  -ROM plus negative    Assessment/Plan: Tanisha Valenzuela is a 35 year old  at 32w0d by LMP, here for evaluation of thick white/yellow vaginal discharge. Considered ROM vs. Infectious causes such as yeast, BV, GC. Workup notable for yeast.     - Plan:   #Vaginal Discharge:  - Wet prep positive for yeast, treat with Diflucan 150mg PO x1   - Gonorrhea and chlamydia pending   - PPROM ruled out with negative ROM plus and no pooling seen on speculum exam    #Fetal wellbeing:  - Category I, reactive NST      - Dispo: Discharge to home. Discussed labor warning signs and indication to return to care. Patient has follow up scheduled for prenatal visit and growth US on .     Olamide Roberson MS3     Resident/Fellow Attestation   I, Anastasiia Hernández, was present with the medical student who participated in the service and in the documentation of the note.  I have verified the history and personally performed the physical exam and medical decision making.  I agree with the assessment and plan of care as documented in the note.     Anastasiia Hernández MD  PGY4  Date of Service (when I saw the patient): 20     Patient seen and examined. Agree with note above. Diflucan 150 mg PO x1 to treat for yeast. Continue with routine follow up.   Jing Aguero MD 2020 9:12 AM

## 2020-09-18 NOTE — DISCHARGE INSTRUCTIONS
Discharge Instruction for Undelivered Patients      You were seen for: Membrane Assessment  You had (Test or Medicine):NST      Diet:   You may eat meals and snacks.     Activity:  Count fetal kicks everyday (see handout)     Call your provider if you notice:  Swelling in your face or increased swelling in your hands or legs.  Headaches that are not relieved by Tylenol (acetaminophen).  Changes in your vision (blurring: seeing spots or stars.)  Nausea (sick to your stomach) and vomiting (throwing up).   Weight gain of 5 pounds or more per week.  Heartburn that doesn't go away.  Signs of bladder infection: pain when you urinate (use the toilet), need to go more often and more urgently.  The bag of batse (rupture of membranes) breaks, or you notice leaking in your underwear.  Bright red blood in your underwear.  Abdominal (lower belly) or stomach pain.  Second (plus) baby: Contractions (tightening) less than 10 minutes apart and getting stronger.  *If less than 34 weeks: Contractions (tightenings) more than 6 times in one hour.  Increase or change in vaginal discharge (note the color and amount)      Follow-up:  As scheduled in the clinic

## 2020-09-19 LAB
BACTERIA SPEC CULT: NORMAL
Lab: NORMAL
SPECIMEN SOURCE: NORMAL

## 2020-09-20 LAB
C TRACH DNA SPEC QL NAA+PROBE: NEGATIVE
N GONORRHOEA DNA SPEC QL NAA+PROBE: NEGATIVE
SPECIMEN SOURCE: NORMAL
SPECIMEN SOURCE: NORMAL

## 2020-09-22 ENCOUNTER — VIRTUAL VISIT (OUTPATIENT)
Dept: ENDOCRINOLOGY | Facility: CLINIC | Age: 35
End: 2020-09-22
Payer: COMMERCIAL

## 2020-09-22 ENCOUNTER — APPOINTMENT (OUTPATIENT)
Dept: INTERPRETER SERVICES | Facility: CLINIC | Age: 35
End: 2020-09-22
Payer: COMMERCIAL

## 2020-09-22 DIAGNOSIS — O24.111 PRE-EXISTING TYPE 2 DIABETES MELLITUS DURING PREGNANCY IN FIRST TRIMESTER: Primary | ICD-10-CM

## 2020-09-22 NOTE — PROGRESS NOTES
"Tanisha Valenzuela is a 35 year old female who is being evaluated via a billable video visit.      The patient has been notified of following:     \"This video visit will be conducted via a call between you and your physician/provider. We have found that certain health care needs can be provided without the need for an in-person physical exam.  This service lets us provide the care you need with a video conversation.  If a prescription is necessary we can send it directly to your pharmacy.  If lab work is needed we can place an order for that and you can then stop by our lab to have the test done at a later time.    Video visits are billed at different rates depending on your insurance coverage.  Please reach out to your insurance provider with any questions.    If during the course of the call the physician/provider feels a video visit is not appropriate, you will not be charged for this service.\"    Patient has given verbal consent for Video visit? Yes  How would you like to obtain your AVS? MyChart  If you are dropped from the video visit, the video invite should be resent to: Text to cell phone: [phone]  Will anyone else be joining your video visit? No      Due to the COVID 19 pandemic this visit was converted to a telephone in order to help prevent spread of infection in this high risk patient and the general population .          Tanisha Valenzuela is a 34 year old female who is being evaluated via a billable telephone visit.      HPI  #1 type 2 diabetes affecting pregnancy    She has been out of strips since last Thursday.  Bg last week was 91 fasting 116 after breakfast.  After lunch 122 2 hours after.  She did not have strips to check after dinner any longer.   Last Wednesday was 120 after dinner.    Before breakfast gives 6 u, Lunch 8 and dinner 10 units.  Metformin 1000mg bid.  Still with 15 units of Levemir.      Friday was 84 at hospital.  Reassured.          #2 pregnancy  Patient is currently " pregnant.  She is approximately 32 weeks.  She anticipates a due date in November 2020. She reports a previous pregnancy (around 2004) for which she did not have gestational diabetes.    #3 positive COVID19 testing  Pt reports positive COVID19 testing as of 7/14.  She reports minimal symptoms and now feeling well.        Past Medical History  Past Medical History:   Diagnosis Date     BMI 37.0-37.9, adult      Hyperlipidemia      PCOS (polycystic ovarian syndrome)      Type 2 diabetes mellitus without complication, with long-term current use of insulin (H)        Allergies  Not on File  Medications  Current Outpatient Medications   Medication Sig Dispense Refill     alcohol swab prep pads Use to swab area of injection/jose as directed. 100 each 3     aspirin 81 MG EC tablet Take 81 mg by mouth daily       insulin aspart (NOVOLOG FLEXPEN) 100 UNIT/ML pen 6 units before breakfast, 8 units before lunch, 10 units before dinner 15 mL 3     insulin detemir (LEVEMIR PEN) 100 UNIT/ML pen Take 15 units daily at bedtime 15 mL 3     insulin pen needle (ULTICARE MICRO) 32G X 4 MM miscellaneous Use 4  pen needles daily or as directed. 100 each 3     metFORMIN (GLUCOPHAGE) 1000 MG tablet Take 1 tablet (1,000 mg) by mouth 2 times daily (with meals) 90 tablet 3     Prenatal Vit-Fe Fumarate-FA (PRENATAL VITAMIN) 27-0.8 MG TABS Take 1 tablet by mouth daily 90 tablet 3     vitamin D3 (CHOLECALCIFEROL) 2000 units (50 mcg) tablet Take 2 tablets (4,000 Units) by mouth daily Take two tablets daily. 180 tablet 3     Family History  family history includes Diabetes in her maternal grandmother and mother.  Social History  Social History     Socioeconomic History     Marital status:      Spouse name: Zander Gil     Number of children: 1     Years of education: Not on file     Highest education level: Not on file   Occupational History     Not on file   Social Needs     Financial resource strain: Not on file     Food insecurity      Worry: Not on file     Inability: Not on file     Transportation needs     Medical: Not on file     Non-medical: Not on file   Tobacco Use     Smoking status: Former Smoker     Smokeless tobacco: Never Used   Substance and Sexual Activity     Alcohol use: Not Currently     Drug use: Never     Sexual activity: Yes     Partners: Male   Lifestyle     Physical activity     Days per week: Not on file     Minutes per session: Not on file     Stress: Not on file   Relationships     Social connections     Talks on phone: Not on file     Gets together: Not on file     Attends Restorationist service: Not on file     Active member of club or organization: Not on file     Attends meetings of clubs or organizations: Not on file     Relationship status: Not on file     Intimate partner violence     Fear of current or ex partner: Not on file     Emotionally abused: Not on file     Physically abused: Not on file     Forced sexual activity: Not on file   Other Topics Concern     Not on file   Social History Narrative    How much exercise per week? Daily activites    How much calcium per day? In prenatals        How much caffeine per day? Not every day  23 week    How much vitamin D per day? In prentals    Do you/your family wear seatbelts?  Yes    Do you/your family use safety helmets? Yes    Do you/your family use sunscreen? Yes    Do you/your family keep firearms in the home? No    Do you/your family have a smoke detector(s)? Yes        [facility]           ROS  Per HPI, otherwise review of symptoms was unremarkable      Physical Exam  LMP 02/01/2020   There is no height or weight on file to calculate BMI.    Vital signs and physical exam not available.  However the patient reports feeling well.  Psych: Alert and oriented times 3; coherent speech, normal   rate and volume, able to articulate logical thoughts, able   to abstract reason, no tangential thoughts, no hallucinations   or delusions        RESULTS  No visits  with results within 1 Week(s) from this visit.   Latest known visit with results is:   Office Visit on 05/01/2020   Component Date Value Ref Range Status     Protein Random Urine 05/01/2020 0.32  g/L Final     Protein Total Urine g/gr Creatinine 05/01/2020 0.22* 0 - 0.2 g/g Cr Final     Sodium 05/01/2020 138  133 - 144 mmol/L Final     Potassium 05/01/2020 3.3* 3.4 - 5.3 mmol/L Final     Chloride 05/01/2020 108  94 - 109 mmol/L Final     Carbon Dioxide 05/01/2020 20  20 - 32 mmol/L Final     Anion Gap 05/01/2020 10  3 - 14 mmol/L Final     Glucose 05/01/2020 125* 70 - 99 mg/dL Final     Urea Nitrogen 05/01/2020 5* 7 - 30 mg/dL Final     Creatinine 05/01/2020 0.51* 0.52 - 1.04 mg/dL Final     GFR Estimate 05/01/2020 >90  >60 mL/min/[1.73_m2] Final    Comment: Non  GFR Calc  Starting 12/18/2018, serum creatinine based estimated GFR (eGFR) will be   calculated using the Chronic Kidney Disease Epidemiology Collaboration   (CKD-EPI) equation.       GFR Estimate If Black 05/01/2020 >90  >60 mL/min/[1.73_m2] Final    Comment:  GFR Calc  Starting 12/18/2018, serum creatinine based estimated GFR (eGFR) will be   calculated using the Chronic Kidney Disease Epidemiology Collaboration   (CKD-EPI) equation.       Calcium 05/01/2020 9.2  8.5 - 10.1 mg/dL Final     Bilirubin Total 05/01/2020 0.3  0.2 - 1.3 mg/dL Final     Albumin 05/01/2020 3.2* 3.4 - 5.0 g/dL Final     Protein Total 05/01/2020 7.6  6.8 - 8.8 g/dL Final     Alkaline Phosphatase 05/01/2020 44  40 - 150 U/L Final     ALT 05/01/2020 27  0 - 50 U/L Final     AST 05/01/2020 15  0 - 45 U/L Final     Creatinine Urine 05/01/2020 144  mg/dL Final       ASSESSMENT:    #1 Type 2 diabetes during third trimester.    She is out of test strips and nearly out of Levemir (which may no longer be covered, but when last testing 5 days ago, BG were at goal.    Plan:  Rx for test strips sent (she was paying out of pocket.)  Continue Levemir 15 units qhs  (may change to Basaglar or Lantus if not covered.)   Continue check BG qid.  Continue 6-8-10u 15 minutes ac tid.  Continue Metformin 1000 mg bid this week, but plan to begin reduce next week if BG still at goal.    #2 pregnancy  Currently 32 week, Continue with OB follow-up.    #3 history of vitamin D deficiency  Patient currently on vitamin D at 4000 IU daily.    #4  positive COVID19 testing  Reports symptoms resolved.  Continue to follow.    Start time: 4:32, stop time: start time at 4:57.    It is my privilege to be involved in the care of the above patient.     Hannah Patel PA-C, MPAS  [facility]  Diabetes, Endocrinology, and Metabolism  [phone] Appointments/Nurse  [phone] Fax  [phone] pager  [phone] nurse line

## 2020-09-22 NOTE — LETTER
"9/22/2020       RE: Tanisha Valenzuela  [address] [facility] [address]     Dear Colleague,    Thank you for referring your patient, Tanisha Valenzuela, to the [facility] ENDOCRINOLOGY at [facility]. Please see a copy of my visit note below.    Tanisha Valenzuela is a 35 year old female who is being evaluated via a billable video visit.      The patient has been notified of following:     \"This video visit will be conducted via a call between you and your physician/provider. We have found that certain health care needs can be provided without the need for an in-person physical exam.  This service lets us provide the care you need with a video conversation.  If a prescription is necessary we can send it directly to your pharmacy.  If lab work is needed we can place an order for that and you can then stop by our lab to have the test done at a later time.    Video visits are billed at different rates depending on your insurance coverage.  Please reach out to your insurance provider with any questions.    If during the course of the call the physician/provider feels a video visit is not appropriate, you will not be charged for this service.\"    Patient has given verbal consent for Video visit? Yes  How would you like to obtain your AVS? MyChart  If you are dropped from the video visit, the video invite should be resent to: Text to cell phone: [phone]  Will anyone else be joining your video visit? No      Due to the COVID 19 pandemic this visit was converted to a telephone in order to help prevent spread of infection in this high risk patient and the general population .          Tanisha Valenzuela is a 34 year old female who is being evaluated via a billable telephone visit.      HPI  #1 type 2 diabetes affecting pregnancy    She has been out of strips since last Thursday.  Bg last week was 91 fasting 116 after breakfast.  After lunch 122 2 hours after.  She " did not have strips to check after dinner any longer.   Last Wednesday was 120 after dinner.    Before breakfast gives 6 u, Lunch 8 and dinner 10 units.  Metformin 1000mg bid.  Still with 15 units of Levemir.      Friday was 84 at hospital.  Reassured.          #2 pregnancy  Patient is currently pregnant.  She is approximately 32 weeks.  She anticipates a due date in November 2020. She reports a previous pregnancy (around 2004) for which she did not have gestational diabetes.    #3 positive COVID19 testing  Pt reports positive COVID19 testing as of 7/14.  She reports minimal symptoms and now feeling well.        Past Medical History  Past Medical History:   Diagnosis Date     BMI 37.0-37.9, adult      Hyperlipidemia      PCOS (polycystic ovarian syndrome)      Type 2 diabetes mellitus without complication, with long-term current use of insulin (H)        Allergies  Not on File  Medications  Current Outpatient Medications   Medication Sig Dispense Refill     alcohol swab prep pads Use to swab area of injection/jose as directed. 100 each 3     aspirin 81 MG EC tablet Take 81 mg by mouth daily       insulin aspart (NOVOLOG FLEXPEN) 100 UNIT/ML pen 6 units before breakfast, 8 units before lunch, 10 units before dinner 15 mL 3     insulin detemir (LEVEMIR PEN) 100 UNIT/ML pen Take 15 units daily at bedtime 15 mL 3     insulin pen needle (ULTICARE MICRO) 32G X 4 MM miscellaneous Use 4  pen needles daily or as directed. 100 each 3     metFORMIN (GLUCOPHAGE) 1000 MG tablet Take 1 tablet (1,000 mg) by mouth 2 times daily (with meals) 90 tablet 3     Prenatal Vit-Fe Fumarate-FA (PRENATAL VITAMIN) 27-0.8 MG TABS Take 1 tablet by mouth daily 90 tablet 3     vitamin D3 (CHOLECALCIFEROL) 2000 units (50 mcg) tablet Take 2 tablets (4,000 Units) by mouth daily Take two tablets daily. 180 tablet 3     Family History  family history includes Diabetes in her maternal grandmother and mother.  Social History  Social History      Socioeconomic History     Marital status:      Spouse name: Zander Gil     Number of children: 1     Years of education: Not on file     Highest education level: Not on file   Occupational History     Not on file   Social Needs     Financial resource strain: Not on file     Food insecurity     Worry: Not on file     Inability: Not on file     Transportation needs     Medical: Not on file     Non-medical: Not on file   Tobacco Use     Smoking status: Former Smoker     Smokeless tobacco: Never Used   Substance and Sexual Activity     Alcohol use: Not Currently     Drug use: Never     Sexual activity: Yes     Partners: Male   Lifestyle     Physical activity     Days per week: Not on file     Minutes per session: Not on file     Stress: Not on file   Relationships     Social connections     Talks on phone: Not on file     Gets together: Not on file     Attends Synagogue service: Not on file     Active member of club or organization: Not on file     Attends meetings of clubs or organizations: Not on file     Relationship status: Not on file     Intimate partner violence     Fear of current or ex partner: Not on file     Emotionally abused: Not on file     Physically abused: Not on file     Forced sexual activity: Not on file   Other Topics Concern     Not on file   Social History Narrative    How much exercise per week? Daily activites    How much calcium per day? In prenatals        How much caffeine per day? Not every day  23 week    How much vitamin D per day? In prentals    Do you/your family wear seatbelts?  Yes    Do you/your family use safety helmets? Yes    Do you/your family use sunscreen? Yes    Do you/your family keep firearms in the home? No    Do you/your family have a smoke detector(s)? Yes        [facility]           ROS  Per HPI, otherwise review of symptoms was unremarkable      Physical Exam  LMP 02/01/2020   There is no height or weight on file to calculate BMI.    Vital  signs and physical exam not available.  However the patient reports feeling well.  Psych: Alert and oriented times 3; coherent speech, normal   rate and volume, able to articulate logical thoughts, able   to abstract reason, no tangential thoughts, no hallucinations   or delusions        RESULTS  No visits with results within 1 Week(s) from this visit.   Latest known visit with results is:   Office Visit on 05/01/2020   Component Date Value Ref Range Status     Protein Random Urine 05/01/2020 0.32  g/L Final     Protein Total Urine g/gr Creatinine 05/01/2020 0.22* 0 - 0.2 g/g Cr Final     Sodium 05/01/2020 138  133 - 144 mmol/L Final     Potassium 05/01/2020 3.3* 3.4 - 5.3 mmol/L Final     Chloride 05/01/2020 108  94 - 109 mmol/L Final     Carbon Dioxide 05/01/2020 20  20 - 32 mmol/L Final     Anion Gap 05/01/2020 10  3 - 14 mmol/L Final     Glucose 05/01/2020 125* 70 - 99 mg/dL Final     Urea Nitrogen 05/01/2020 5* 7 - 30 mg/dL Final     Creatinine 05/01/2020 0.51* 0.52 - 1.04 mg/dL Final     GFR Estimate 05/01/2020 >90  >60 mL/min/[1.73_m2] Final    Comment: Non  GFR Calc  Starting 12/18/2018, serum creatinine based estimated GFR (eGFR) will be   calculated using the Chronic Kidney Disease Epidemiology Collaboration   (CKD-EPI) equation.       GFR Estimate If Black 05/01/2020 >90  >60 mL/min/[1.73_m2] Final    Comment:  GFR Calc  Starting 12/18/2018, serum creatinine based estimated GFR (eGFR) will be   calculated using the Chronic Kidney Disease Epidemiology Collaboration   (CKD-EPI) equation.       Calcium 05/01/2020 9.2  8.5 - 10.1 mg/dL Final     Bilirubin Total 05/01/2020 0.3  0.2 - 1.3 mg/dL Final     Albumin 05/01/2020 3.2* 3.4 - 5.0 g/dL Final     Protein Total 05/01/2020 7.6  6.8 - 8.8 g/dL Final     Alkaline Phosphatase 05/01/2020 44  40 - 150 U/L Final     ALT 05/01/2020 27  0 - 50 U/L Final     AST 05/01/2020 15  0 - 45 U/L Final     Creatinine Urine 05/01/2020 144   mg/dL Final       ASSESSMENT:    #1 Type 2 diabetes during third trimester.    She is out of test strips and nearly out of Levemir (which may no longer be covered, but when last testing 5 days ago, BG were at goal.    Plan:  Rx for test strips sent (she was paying out of pocket.)  Continue Levemir 15 units qhs (may change to Basaglar or Lantus if not covered.)   Continue check BG qid.  Continue 6-8-10u 15 minutes ac tid.  Continue Metformin 1000 mg bid this week, but plan to begin reduce next week if BG still at goal.    #2 pregnancy  Currently 32 week, Continue with OB follow-up.    #3 history of vitamin D deficiency  Patient currently on vitamin D at 4000 IU daily.    #4  positive COVID19 testing  Reports symptoms resolved.  Continue to follow.    Start time: 4:32, stop time: start time at 4:57.    It is my privilege to be involved in the care of the above patient.     Hannah Patel PA-C, MPAS  [facility]  Diabetes, Endocrinology, and Metabolism  [phone] Appointments/Nurse  [phone] Fax  [phone] pager  [phone] nurse line

## 2020-09-28 ENCOUNTER — OFFICE VISIT (OUTPATIENT)
Dept: OBGYN | Facility: CLINIC | Age: 35
End: 2020-09-28
Payer: COMMERCIAL

## 2020-09-28 ENCOUNTER — ANCILLARY PROCEDURE (OUTPATIENT)
Dept: ULTRASOUND IMAGING | Facility: CLINIC | Age: 35
End: 2020-09-28
Attending: OBSTETRICS & GYNECOLOGY
Payer: COMMERCIAL

## 2020-09-28 VITALS
WEIGHT: 191.8 LBS | HEART RATE: 75 BPM | BODY MASS INDEX: 38.67 KG/M2 | SYSTOLIC BLOOD PRESSURE: 119 MMHG | DIASTOLIC BLOOD PRESSURE: 74 MMHG | HEIGHT: 59 IN

## 2020-09-28 DIAGNOSIS — E11.9 TYPE 2 DIABETES MELLITUS WITHOUT COMPLICATION, WITHOUT LONG-TERM CURRENT USE OF INSULIN (H): Primary | ICD-10-CM

## 2020-09-28 DIAGNOSIS — E11.9 TYPE 2 DIABETES MELLITUS WITHOUT COMPLICATION, WITHOUT LONG-TERM CURRENT USE OF INSULIN (H): ICD-10-CM

## 2020-09-28 PROCEDURE — 76816 OB US FOLLOW-UP PER FETUS: CPT

## 2020-09-28 ASSESSMENT — MIFFLIN-ST. JEOR: SCORE: 1475.94

## 2020-09-29 NOTE — PROGRESS NOTES
"Doing well. Has BS values nearly all normal. Has plenty of supplies and insulin.   Desire TOLAC, discussed at last visit, but consent not signed.    /74   Pulse 75   Ht 1.499 m (4' 11.02\")   Wt 87 kg (191 lb 12.8 oz)   LMP 2020   BMI 38.71 kg/m    See flow    A/p: 36 yo  at 33+3 wks, PEDRO LUIS.      1. PNC. Utd.    2. DM 2.  Insulin per Dr. Lopez.  BS nearly all wnl.    2x/week BPPS and serial growth uls. Delivery planning as VIKAS gets closer. Likely plan IOL 38-39 wks.      3. Signed TOLAC consent today.     Mala Washburn MD, [facility]  OB/GYN    2020  4:38 PM    "

## 2020-10-01 ENCOUNTER — ANCILLARY PROCEDURE (OUTPATIENT)
Dept: ULTRASOUND IMAGING | Facility: CLINIC | Age: 35
End: 2020-10-01
Attending: OBSTETRICS & GYNECOLOGY
Payer: COMMERCIAL

## 2020-10-01 DIAGNOSIS — E11.9 TYPE 2 DIABETES MELLITUS WITHOUT COMPLICATION, WITHOUT LONG-TERM CURRENT USE OF INSULIN (H): ICD-10-CM

## 2020-10-01 PROCEDURE — 76819 FETAL BIOPHYS PROFIL W/O NST: CPT | Performed by: OBSTETRICS & GYNECOLOGY

## 2020-10-01 PROCEDURE — 76819 FETAL BIOPHYS PROFIL W/O NST: CPT

## 2020-10-05 ENCOUNTER — ANCILLARY PROCEDURE (OUTPATIENT)
Dept: ULTRASOUND IMAGING | Facility: CLINIC | Age: 35
End: 2020-10-05
Attending: OBSTETRICS & GYNECOLOGY
Payer: COMMERCIAL

## 2020-10-05 PROCEDURE — 76819 FETAL BIOPHYS PROFIL W/O NST: CPT

## 2020-10-05 PROCEDURE — 76819 FETAL BIOPHYS PROFIL W/O NST: CPT | Performed by: OBSTETRICS & GYNECOLOGY

## 2020-10-08 ENCOUNTER — ANCILLARY PROCEDURE (OUTPATIENT)
Dept: ULTRASOUND IMAGING | Facility: CLINIC | Age: 35
End: 2020-10-08
Attending: OBSTETRICS & GYNECOLOGY
Payer: COMMERCIAL

## 2020-10-08 PROCEDURE — 76819 FETAL BIOPHYS PROFIL W/O NST: CPT | Mod: 26 | Performed by: OBSTETRICS & GYNECOLOGY

## 2020-10-08 PROCEDURE — 76819 FETAL BIOPHYS PROFIL W/O NST: CPT

## 2020-10-10 DIAGNOSIS — E11.29 TYPE 2 DIABETES MELLITUS WITH MICROALBUMINURIA, UNSPECIFIED WHETHER LONG TERM INSULIN USE: ICD-10-CM

## 2020-10-10 DIAGNOSIS — R80.9 TYPE 2 DIABETES MELLITUS WITH MICROALBUMINURIA, UNSPECIFIED WHETHER LONG TERM INSULIN USE: ICD-10-CM

## 2020-10-12 ENCOUNTER — OFFICE VISIT (OUTPATIENT)
Dept: OBGYN | Facility: CLINIC | Age: 35
End: 2020-10-12
Attending: OBSTETRICS & GYNECOLOGY
Payer: COMMERCIAL

## 2020-10-12 ENCOUNTER — ANCILLARY PROCEDURE (OUTPATIENT)
Dept: ULTRASOUND IMAGING | Facility: CLINIC | Age: 35
End: 2020-10-12
Attending: OBSTETRICS & GYNECOLOGY
Payer: COMMERCIAL

## 2020-10-12 VITALS
SYSTOLIC BLOOD PRESSURE: 103 MMHG | HEIGHT: 59 IN | HEART RATE: 73 BPM | DIASTOLIC BLOOD PRESSURE: 65 MMHG | BODY MASS INDEX: 38.52 KG/M2 | WEIGHT: 191.1 LBS

## 2020-10-12 DIAGNOSIS — O09.90 HIGH RISK PREGNANCY, ANTEPARTUM: ICD-10-CM

## 2020-10-12 DIAGNOSIS — E11.29 TYPE 2 DIABETES MELLITUS WITH MICROALBUMINURIA, UNSPECIFIED WHETHER LONG TERM INSULIN USE: Primary | ICD-10-CM

## 2020-10-12 DIAGNOSIS — O09.892 SUPERVISION OF OTHER HIGH RISK PREGNANCIES, SECOND TRIMESTER: Primary | ICD-10-CM

## 2020-10-12 DIAGNOSIS — E11.29 TYPE 2 DIABETES MELLITUS WITH MICROALBUMINURIA, UNSPECIFIED WHETHER LONG TERM INSULIN USE: ICD-10-CM

## 2020-10-12 DIAGNOSIS — R80.9 TYPE 2 DIABETES MELLITUS WITH MICROALBUMINURIA, UNSPECIFIED WHETHER LONG TERM INSULIN USE: ICD-10-CM

## 2020-10-12 DIAGNOSIS — Z98.891 PREVIOUS CESAREAN SECTION: ICD-10-CM

## 2020-10-12 DIAGNOSIS — R80.9 TYPE 2 DIABETES MELLITUS WITH MICROALBUMINURIA, UNSPECIFIED WHETHER LONG TERM INSULIN USE: Primary | ICD-10-CM

## 2020-10-12 PROCEDURE — G0008 ADMIN INFLUENZA VIRUS VAC: HCPCS

## 2020-10-12 PROCEDURE — G0463 HOSPITAL OUTPT CLINIC VISIT: HCPCS

## 2020-10-12 PROCEDURE — 76819 FETAL BIOPHYS PROFIL W/O NST: CPT

## 2020-10-12 PROCEDURE — 87653 STREP B DNA AMP PROBE: CPT | Performed by: OBSTETRICS & GYNECOLOGY

## 2020-10-12 PROCEDURE — 99207 PR PRENATAL VISIT: CPT | Performed by: OBSTETRICS & GYNECOLOGY

## 2020-10-12 PROCEDURE — 76819 FETAL BIOPHYS PROFIL W/O NST: CPT | Mod: 26 | Performed by: OBSTETRICS & GYNECOLOGY

## 2020-10-12 PROCEDURE — 90686 IIV4 VACC NO PRSV 0.5 ML IM: CPT

## 2020-10-12 PROCEDURE — 250N000011 HC RX IP 250 OP 636

## 2020-10-12 ASSESSMENT — MIFFLIN-ST. JEOR: SCORE: 1472.76

## 2020-10-12 NOTE — PROGRESS NOTES
"Outcome for 10/12/20 1:57 PM :Left Voicemail for patient to call back    Outcome for 10/13/20 11:07 AM :Left Voicemail for patient to call back     Tanisha Valenzuela is a 35 year old female who is being evaluated via a billable video visit.      The patient has been notified of following:     \"This video visit will be conducted via a call between you and your physician/provider. We have found that certain health care needs can be provided without the need for an in-person physical exam.  This service lets us provide the care you need with a video conversation.  If a prescription is necessary we can send it directly to your pharmacy.  If lab work is needed we can place an order for that and you can then stop by our lab to have the test done at a later time.    Video visits are billed at different rates depending on your insurance coverage.  Please reach out to your insurance provider with any questions.    If during the course of the call the physician/provider feels a video visit is not appropriate, you will not be charged for this service.\"    Patient has given verbal consent for Video visit? Yes  How would you like to obtain your AVS? MyChart  If you are dropped from the video visit, the video invite should be resent to: Text to cell phone: [phone]  Will anyone else be joining your video visit? No      Due to the COVID 19 pandemic this visit was converted to a video in order to help prevent spread of infection in this high risk patient and the general population .          Tanisha Valenzuela is a 34 year old female who is being evaluated via a billable telephone visit.      Diabetes Virtual Visit   Hannah Patel  2020      Tanisha Romano is a 36 yo  F now 35w4d gestation who is being seen for follow-up of Type 2 diabetes during pregnancy.  She has been obese for many years and was diagnosed with type 2 diabetes in May 2016 after BMP revealed random BG of 242.  She had no know DM in " previous pregnancy 15 years ago.      Today she is glad BG overall better, but they are a bit above goals.  She is getting hungry and eats more, mostly at lunch time where she and colleagues share food.  She doesn't really snack at all, and isn't very hungry until her later breakfast around 10 am, nor after work.        BG Garay tells me that her BG   Fastin 106 96  P breakfast: 117 113 114  P lunch: 148 122 125  P dinner:       125 127    High after pizza for lunch yesterday.      The lowest BG has seen is 84 one month ago.      She eats breakfast 10 am, lunch at work non, has a break at 2:30, but generally not hungry - rarely may eat a fruit, rarely some chips.  Eats generally before 6 pm.  Her largest meal is lunch.  She doesn't have bedtime snack. On the weekend she may go out for fast food and she may eat later.     Per Ob and reviewed, confirmed that she has baseline headaches, but has not had any changes in her headaches. A couple of times a day she notices a hardening of her abdomen that could be [place]-Choudhury contractions. In the past couple of weeks, she has a few times where her heart is racing. She thinks that this correlates with when she eats starchy foods, pizza. She has also noticed some LE swelling when she is standing or sitting for a long time.    Current regimen:   levemir 15U daily,   Novolog 6U/8U/10U,   metformin 1000mg BID.     Lowest BG recently was 84 at hospital last month, no others. Highest 148.      Pt reports positive COVID19 testing as of .  She reports minimal symptoms and now feeling well.        Past Medical History  Past Medical History:   Diagnosis Date     BMI 37.0-37.9, adult      Hyperlipidemia      PCOS (polycystic ovarian syndrome)      Type 2 diabetes mellitus without complication, with long-term current use of insulin (H)        Allergies  No Known Allergies  Medications  Current Outpatient Medications   Medication Sig Dispense Refill     alcohol swab prep pads  Use to swab area of injection/jose as directed. 100 each 3     aspirin 81 MG EC tablet Take 81 mg by mouth daily       blood glucose (NO BRAND SPECIFIED) lancets standard Use to test blood sugar 4 times daily or as directed. 200 each 3     blood glucose (NO BRAND SPECIFIED) test strip Use to test blood sugar 4 times daily or as directed. 200 strip 3     blood glucose (NO BRAND SPECIFIED) test strip Use to test blood sugar 4 times daily or as directed. 200 strip 3     blood glucose monitoring (NO BRAND SPECIFIED) meter device kit Use to test blood sugar 4 times daily or as directed. 1 kit 0     insulin aspart (NOVOLOG FLEXPEN) 100 UNIT/ML pen 6 units before breakfast, 8 units before lunch, 10 units before dinner 15 mL 3     insulin detemir (LEVEMIR PEN) 100 UNIT/ML pen Take 15 units daily at bedtime 15 mL 3     insulin pen needle (ULTICARE MICRO) 32G X 4 MM miscellaneous Use 4  pen needles daily or as directed. 100 each 3     metFORMIN (GLUCOPHAGE) 1000 MG tablet Take 1 tablet (1,000 mg) by mouth 2 times daily (with meals) 90 tablet 3     Prenatal Vit-Fe Fumarate-FA (PRENATAL VITAMIN) 27-0.8 MG TABS Take 1 tablet by mouth daily 90 tablet 3     vitamin D3 (CHOLECALCIFEROL) 2000 units (50 mcg) tablet Take 2 tablets (4,000 Units) by mouth daily Take two tablets daily. 180 tablet 3     Family History  family history includes Diabetes in her maternal grandmother and mother.  Social History   to Zander Gil; has one child, a 15 yo daughter.   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  ROS  Per HPI, otherwise review of symptoms was unremarkable      Physical Exam  LMP 02/01/2020   There is no height or weight on file to calculate BMI.    Vital signs and physical exam not available.  However the patient reports feeling well.  Psych: Alert and oriented times 3; coherent speech, normal   rate and volume, able to articulate logical thoughts, able   to  abstract reason, no tangential thoughts, no hallucinations   or delusions    Wt Readings from Last 10 Encounters:   10/12/20 86.7 kg (191 lb 1.6 oz)   09/28/20 87 kg (191 lb 12.8 oz)   09/18/20 85.2 kg (187 lb 13.3 oz)   09/09/20 85.2 kg (187 lb 12.8 oz)   08/26/20 85.3 kg (188 lb)   07/10/20 83.5 kg (184 lb 1.6 oz)   05/01/20 83.1 kg (183 lb 3.2 oz)   03/30/20 84.1 kg (185 lb 4.8 oz)         RESULTS  No visits with results within 1 Week(s) from this visit.   Latest known visit with results is:   Office Visit on 05/01/2020   Component Date Value Ref Range Status     Protein Random Urine 05/01/2020 0.32  g/L Final     Protein Total Urine g/gr Creatinine 05/01/2020 0.22* 0 - 0.2 g/g Cr Final     Sodium 05/01/2020 138  133 - 144 mmol/L Final     Potassium 05/01/2020 3.3* 3.4 - 5.3 mmol/L Final     Chloride 05/01/2020 108  94 - 109 mmol/L Final     Carbon Dioxide 05/01/2020 20  20 - 32 mmol/L Final     Anion Gap 05/01/2020 10  3 - 14 mmol/L Final     Glucose 05/01/2020 125* 70 - 99 mg/dL Final     Urea Nitrogen 05/01/2020 5* 7 - 30 mg/dL Final     Creatinine 05/01/2020 0.51* 0.52 - 1.04 mg/dL Final     GFR Estimate 05/01/2020 >90  >60 mL/min/[1.73_m2] Final    Comment: Non  GFR Calc  Starting 12/18/2018, serum creatinine based estimated GFR (eGFR) will be   calculated using the Chronic Kidney Disease Epidemiology Collaboration   (CKD-EPI) equation.       GFR Estimate If Black 05/01/2020 >90  >60 mL/min/[1.73_m2] Final    Comment:  GFR Calc  Starting 12/18/2018, serum creatinine based estimated GFR (eGFR) will be   calculated using the Chronic Kidney Disease Epidemiology Collaboration   (CKD-EPI) equation.       Calcium 05/01/2020 9.2  8.5 - 10.1 mg/dL Final     Bilirubin Total 05/01/2020 0.3  0.2 - 1.3 mg/dL Final     Albumin 05/01/2020 3.2* 3.4 - 5.0 g/dL Final     Protein Total 05/01/2020 7.6  6.8 - 8.8 g/dL Final     Alkaline Phosphatase 05/01/2020 44  40 - 150 U/L Final     ALT  05/01/2020 27  0 - 50 U/L Final     AST 05/01/2020 15  0 - 45 U/L Final     Creatinine Urine 05/01/2020 144  mg/dL Final       ASSESSMENT:    Type 2 diabetes during third trimester.    BG are near goal and she remains on Metformin.      Plan:    Increase  Levemir to 16 units qhs (may change to Basaglar or Lantus if not covered.)   Continue check BG qid.  Increase to  6-9-11u 15 - 30 minutes ac tid.  Add 1-2 units of you know you will eat a lot more.    Ideally BG at goal and reduce Metformin next week.    vitamin D deficiency  Patient continues on vitamin D at 4000 IU daily.    positive COVID19 testing  Reports symptoms resolved.  Continue to follow.    Obesity: Very limited weight gain in pregnancy.  Continue to follow and encourage heart healthy diet and physical activity.        It is my privilege to be involved in the care of the above patient.     Hannah Patel PA-C, MPAS  [facility]  Diabetes, Endocrinology, and Metabolism  [phone] Appointments/Nurse  [phone] Fax  [phone] pager  [phone] nurse line

## 2020-10-12 NOTE — LETTER
10/12/2020       RE: Tanisha Valenzuela  [address] [facility] [address]     Dear Colleague,    Thank you for referring your patient, Tanisha Valenzuela, to the [facility] [place] at [facility]. Please see a copy of my visit note below.    PEDRO LUIS Visit 10/12/2020    S:  Reema Romano is a 35 year old female, , who is 35w3d. She is feeling well. She denies any vaginal bleeding or spotting, no vision changes, and no shortness of breath. She has baseline headaches, but has not had any changes in her headaches. A couple of times a day she notices a hardening of her abdomen that could be [place]-Choudhury contractions. In the past couple of weeks, she has a few times where her heart is racing. She thinks that this correlates with when she eats sweets/foods with lots of sugar. She has also noticed some LE swelling when she is standing or sitting for a long time.    Her blood sugar is not as well controlled recently. Today her morning fasting glucose was 106. Her 2 hour postprandial glucose was 113 after breakfast and 123 after lunch. She is following-up with endocrinology tomorrow.      O:  See OB Flowsheet    35 year old  at 35w3d presents for PEDRO LUIS visit  1) PNC: Rh positive, Kala negative, Rubella immune, Infectious labs wnl.  2) Genetic screening: Declined, Normal Level II US, normal fetal echo due to Type 2 DM  3) Type II DM: Follows with endocrinology although minimal visits, last virtual 20. Has a virtual visit scheduled tomorrow.  Based on her sugars today, appears she will likely require adjustment of her insulin regimen as fasting elevated. Current regimen: levemir 15U daily, Novolog 6U/8U/10U, metformin 1000mg BID.  Growth US q4w, last 20 with EFW 58%ile. Normal fetal echo on 7/10/2020.  Getting twice weekly BPP, Today . On ASA 81mg daily for preeclampsia prophylaxis, had baseline labs with UPC 0.22. Plan IOL for  diabetes likely at 37-39 weeks depending on sugar control.  4) History of  section via VML: Per patient, occurred in [place], was for failure to dilate past 1cm and 10 pound baby that pregnancy. Infraumbilical VML incision, she is unsure what the uterine incision is. She tried to get the records but was not able to. She was not told that she couldn't have a vaginal delivery.  The patient desires a TOLAC. TOLAC consent signed 2020.  Aware of limited options for cervical ripening if induction required and can continue to discuss moving forward.  5) s/p tdap, flu shot completed today   6) GBS today, completed  7) RTC in 3 days for BPP, PEDRO LUIS visit in 1 week    Latonya Harmon MS3    Staff MD Note    I appreciate the note above by Latonya Harmon MS3. I agree with the PFSH and ROS as completed by the MS. The remainder of the encounter was performed by me and scribed by the MS. The scribed note accurately reflects my personal services and the decisions made by me.    Mikayla Waite MD

## 2020-10-12 NOTE — PROGRESS NOTES
PEDRO LUIS Visit 10/12/2020    S:  Reema Romano is a 35 year old female, , who is 35w3d. She is feeling well. She denies any vaginal bleeding or spotting, no vision changes, and no shortness of breath. She has baseline headaches, but has not had any changes in her headaches. A couple of times a day she notices a hardening of her abdomen that could be [place]-Choudhury contractions. In the past couple of weeks, she has a few times where her heart is racing. She thinks that this correlates with when she eats sweets/foods with lots of sugar. She has also noticed some LE swelling when she is standing or sitting for a long time.    Her blood sugar is not as well controlled recently. Today her morning fasting glucose was 106. Her 2 hour postprandial glucose was 113 after breakfast and 123 after lunch. She is following-up with endocrinology tomorrow.      O:  See OB Flowsheet    35 year old  at 35w3d presents for PEDRO LUIS visit  1) PNC: Rh positive, Kala negative, Rubella immune, Infectious labs wnl.  2) Genetic screening: Declined, Normal Level II US, normal fetal echo due to Type 2 DM  3) Type II DM: Follows with endocrinology although minimal visits, last virtual 20. Has a virtual visit scheduled tomorrow.  Based on her sugars today, appears she will likely require adjustment of her insulin regimen as fasting elevated. Current regimen: levemir 15U daily, Novolog 6U/8U/10U, metformin 1000mg BID.  Growth US q4w, last 20 with EFW 58%ile. Normal fetal echo on 7/10/2020.  Getting twice weekly BPP, Today . On ASA 81mg daily for preeclampsia prophylaxis, had baseline labs with UPC 0.22. Plan IOL for diabetes likely at 37-39 weeks depending on sugar control.  4) History of  section via VML: Per patient, occurred in [place], was for failure to dilate past 1cm and 10 pound baby that pregnancy. Infraumbilical VML incision, she is unsure what the uterine incision is. She tried to get the records but was not able to.  She was not told that she couldn't have a vaginal delivery.  The patient desires a TOLAC. TOLAC consent signed 9/28/2020.  Aware of limited options for cervical ripening if induction required and can continue to discuss moving forward.  5) s/p tdap, flu shot completed today   6) GBS today, completed  7) RTC in 3 days for BPP, PEDRO LUIS visit in 1 week    Latonya Harmon MS3    Staff MD Note    I appreciate the note above by Latonya Harmon MS3. I agree with the PFSH and ROS as completed by the MS. The remainder of the encounter was performed by me and scribed by the MS. The scribed note accurately reflects my personal services and the decisions made by me.    Mikayla Waite MD

## 2020-10-13 ENCOUNTER — VIRTUAL VISIT (OUTPATIENT)
Dept: ENDOCRINOLOGY | Facility: CLINIC | Age: 35
End: 2020-10-13
Payer: COMMERCIAL

## 2020-10-13 DIAGNOSIS — O24.111 PRE-EXISTING TYPE 2 DIABETES MELLITUS DURING PREGNANCY IN FIRST TRIMESTER: ICD-10-CM

## 2020-10-13 LAB
GP B STREP DNA SPEC QL NAA+PROBE: NEGATIVE
SPECIMEN SOURCE: NORMAL

## 2020-10-13 PROCEDURE — 99214 OFFICE O/P EST MOD 30 MIN: CPT | Mod: GT | Performed by: PHYSICIAN ASSISTANT

## 2020-10-13 RX ORDER — INSULIN ASPART 100 [IU]/ML
INJECTION, SOLUTION INTRAVENOUS; SUBCUTANEOUS
Qty: 15 ML | Refills: 3 | Status: ON HOLD
Start: 2020-10-13 | End: 2020-11-07

## 2020-10-13 NOTE — LETTER
"10/13/2020       RE: Tanisha Valenzuela  [address] [facility] [address]     Dear Colleague,    Thank you for referring your patient, Tanisha Valenzuela, to the [facility] ENDOCRINOLOGY CLINIC [place] at [facility]. Please see a copy of my visit note below.    Outcome for 10/12/20 1:57 PM :Left Voicemail for patient to call back    Outcome for 10/13/20 11:07 AM :Left Voicemail for patient to call back     Tanisha Valenzuela is a 35 year old female who is being evaluated via a billable video visit.      The patient has been notified of following:     \"This video visit will be conducted via a call between you and your physician/provider. We have found that certain health care needs can be provided without the need for an in-person physical exam.  This service lets us provide the care you need with a video conversation.  If a prescription is necessary we can send it directly to your pharmacy.  If lab work is needed we can place an order for that and you can then stop by our lab to have the test done at a later time.    Video visits are billed at different rates depending on your insurance coverage.  Please reach out to your insurance provider with any questions.    If during the course of the call the physician/provider feels a video visit is not appropriate, you will not be charged for this service.\"    Patient has given verbal consent for Video visit? Yes  How would you like to obtain your AVS? MyChart  If you are dropped from the video visit, the video invite should be resent to: Text to cell phone: [phone]  Will anyone else be joining your video visit? No      Due to the COVID 19 pandemic this visit was converted to a video in order to help prevent spread of infection in this high risk patient and the general population .          Tanisha Valenzuela is a 34 year old female who is being evaluated via a billable telephone visit.      Diabetes " Virtual Visit   Hannah Fried Amanda  2020      Tanisha Romano is a 36 yo  F now 35w4d gestation who is being seen for follow-up of Type 2 diabetes during pregnancy.  She has been obese for many years and was diagnosed with type 2 diabetes in May 2016 after BMP revealed random BG of 242.  She had no know DM in previous pregnancy 15 years ago.      Today she is glad BG overall better, but they are a bit above goals.  She is getting hungry and eats more, mostly at lunch time where she and colleagues share food.  She doesn't really snack at all, and isn't very hungry until her later breakfast around 10 am, nor after work.        BG     Tanisha tells me that her BG   Fastin 106 96  P breakfast: 117 113 114  P lunch: 148 122 125  P dinner:       125 127    High after pizza for lunch yesterday.      The lowest BG has seen is 84 one month ago.      She eats breakfast 10 am, lunch at work non, has a break at 2:30, but generally not hungry - rarely may eat a fruit, rarely some chips.  Eats generally before 6 pm.  Her largest meal is lunch.  She doesn't have bedtime snack. On the weekend she may go out for fast food and she may eat later.     Per Ob and reviewed, confirmed that she has baseline headaches, but has not had any changes in her headaches. A couple of times a day she notices a hardening of her abdomen that could be [place] contractions. In the past couple of weeks, she has a few times where her heart is racing. She thinks that this correlates with when she eats starchy foods, pizza. She has also noticed some LE swelling when she is standing or sitting for a long time.    Current regimen:   levemir 15U daily,   Novolog 6U/8U/10U,   metformin 1000mg BID.     Lowest BG recently was 84 at hospital last month, no others. Highest 148.      Pt reports positive COVID19 testing as of .  She reports minimal symptoms and now feeling well.        Past Medical History  Past Medical History:   Diagnosis  Date     BMI 37.0-37.9, adult      Hyperlipidemia      PCOS (polycystic ovarian syndrome)      Type 2 diabetes mellitus without complication, with long-term current use of insulin (H)        Allergies  No Known Allergies  Medications  Current Outpatient Medications   Medication Sig Dispense Refill     alcohol swab prep pads Use to swab area of injection/jose as directed. 100 each 3     aspirin 81 MG EC tablet Take 81 mg by mouth daily       blood glucose (NO BRAND SPECIFIED) lancets standard Use to test blood sugar 4 times daily or as directed. 200 each 3     blood glucose (NO BRAND SPECIFIED) test strip Use to test blood sugar 4 times daily or as directed. 200 strip 3     blood glucose (NO BRAND SPECIFIED) test strip Use to test blood sugar 4 times daily or as directed. 200 strip 3     blood glucose monitoring (NO BRAND SPECIFIED) meter device kit Use to test blood sugar 4 times daily or as directed. 1 kit 0     insulin aspart (NOVOLOG FLEXPEN) 100 UNIT/ML pen 6 units before breakfast, 8 units before lunch, 10 units before dinner 15 mL 3     insulin detemir (LEVEMIR PEN) 100 UNIT/ML pen Take 15 units daily at bedtime 15 mL 3     insulin pen needle (ULTICARE MICRO) 32G X 4 MM miscellaneous Use 4  pen needles daily or as directed. 100 each 3     metFORMIN (GLUCOPHAGE) 1000 MG tablet Take 1 tablet (1,000 mg) by mouth 2 times daily (with meals) 90 tablet 3     Prenatal Vit-Fe Fumarate-FA (PRENATAL VITAMIN) 27-0.8 MG TABS Take 1 tablet by mouth daily 90 tablet 3     vitamin D3 (CHOLECALCIFEROL) 2000 units (50 mcg) tablet Take 2 tablets (4,000 Units) by mouth daily Take two tablets daily. 180 tablet 3     Family History  family history includes Diabetes in her maternal grandmother and mother.  Social History   to Zander Gil; has one child, a 15 yo daughter.   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  ROS  Per HPI, otherwise review of symptoms  was unremarkable      Physical Exam  LMP 02/01/2020   There is no height or weight on file to calculate BMI.    Vital signs and physical exam not available.  However the patient reports feeling well.  Psych: Alert and oriented times 3; coherent speech, normal   rate and volume, able to articulate logical thoughts, able   to abstract reason, no tangential thoughts, no hallucinations   or delusions    Wt Readings from Last 10 Encounters:   10/12/20 86.7 kg (191 lb 1.6 oz)   09/28/20 87 kg (191 lb 12.8 oz)   09/18/20 85.2 kg (187 lb 13.3 oz)   09/09/20 85.2 kg (187 lb 12.8 oz)   08/26/20 85.3 kg (188 lb)   07/10/20 83.5 kg (184 lb 1.6 oz)   05/01/20 83.1 kg (183 lb 3.2 oz)   03/30/20 84.1 kg (185 lb 4.8 oz)         RESULTS  No visits with results within 1 Week(s) from this visit.   Latest known visit with results is:   Office Visit on 05/01/2020   Component Date Value Ref Range Status     Protein Random Urine 05/01/2020 0.32  g/L Final     Protein Total Urine g/gr Creatinine 05/01/2020 0.22* 0 - 0.2 g/g Cr Final     Sodium 05/01/2020 138  133 - 144 mmol/L Final     Potassium 05/01/2020 3.3* 3.4 - 5.3 mmol/L Final     Chloride 05/01/2020 108  94 - 109 mmol/L Final     Carbon Dioxide 05/01/2020 20  20 - 32 mmol/L Final     Anion Gap 05/01/2020 10  3 - 14 mmol/L Final     Glucose 05/01/2020 125* 70 - 99 mg/dL Final     Urea Nitrogen 05/01/2020 5* 7 - 30 mg/dL Final     Creatinine 05/01/2020 0.51* 0.52 - 1.04 mg/dL Final     GFR Estimate 05/01/2020 >90  >60 mL/min/[1.73_m2] Final    Comment: Non  GFR Calc  Starting 12/18/2018, serum creatinine based estimated GFR (eGFR) will be   calculated using the Chronic Kidney Disease Epidemiology Collaboration   (CKD-EPI) equation.       GFR Estimate If Black 05/01/2020 >90  >60 mL/min/[1.73_m2] Final    Comment:  GFR Calc  Starting 12/18/2018, serum creatinine based estimated GFR (eGFR) will be   calculated using the Chronic Kidney Disease  Epidemiology Collaboration   (CKD-EPI) equation.       Calcium 05/01/2020 9.2  8.5 - 10.1 mg/dL Final     Bilirubin Total 05/01/2020 0.3  0.2 - 1.3 mg/dL Final     Albumin 05/01/2020 3.2* 3.4 - 5.0 g/dL Final     Protein Total 05/01/2020 7.6  6.8 - 8.8 g/dL Final     Alkaline Phosphatase 05/01/2020 44  40 - 150 U/L Final     ALT 05/01/2020 27  0 - 50 U/L Final     AST 05/01/2020 15  0 - 45 U/L Final     Creatinine Urine 05/01/2020 144  mg/dL Final       ASSESSMENT:    Type 2 diabetes during third trimester.    BG are near goal and she remains on Metformin.      Plan:    Increase  Levemir to 16 units qhs (may change to Basaglar or Lantus if not covered.)   Continue check BG qid.  Increase to  6-9-11u 15 - 30 minutes ac tid.  Add 1-2 units of you know you will eat a lot more.    Ideally BG at goal and reduce Metformin next week.    vitamin D deficiency  Patient continues on vitamin D at 4000 IU daily.    positive COVID19 testing  Reports symptoms resolved.  Continue to follow.    Obesity: Very limited weight gain in pregnancy.  Continue to follow and encourage heart healthy diet and physical activity.        It is my privilege to be involved in the care of the above patient.     Hannah Patel PA-C, MPAS  [facility]  Diabetes, Endocrinology, and Metabolism  [phone] Appointments/Nurse  [phone] Fax  [phone] pager  [phone] nurse line

## 2020-10-14 ENCOUNTER — TELEPHONE (OUTPATIENT)
Dept: ENDOCRINOLOGY | Facility: CLINIC | Age: 35
End: 2020-10-14

## 2020-10-14 NOTE — TELEPHONE ENCOUNTER
metFORMIN HCl 1000 MG Oral Tablet  Last Written Prescription Date:  4/10/20  Last Fill Quantity: 90,   # refills: 3  Last Office Visit : 9/22/20  Future Office visit:  none    Routing refill request to provider for review/approval because:  Pt is pregnant  Thank-you, Radha KIM RN Medication Refill Team

## 2020-10-14 NOTE — PATIENT INSTRUCTIONS
Siempre en gusto conversar con Ud..  Le claire barrera buen control.      Por favor aumenta 16 unidades Levemir de noche con la meta que todos en ayunas <95.    En el lunch increase to 9 unidades insulin, increase dinner to 11 unidades.  Si come algo kadeem pizza y sabe que va a salir mas alto, pone otra unidad extra.    Si todos los numeros estan a las metas de <95 en ayunas y <120 2 horas despues de comer, por favor Gratis solo 1000 mg a nereida de Metformina.  Queremos parar eso antes del parto so es que podemos sin mucha problema.      Llame con BG <70 or seguido >140.      My best wishes,    Hannah Patel PA-C, MPAS  [facility]  Diabetes, Endocrinology, and Metabolism  [phone] Appointments/Nurse  [phone] Fax  [phone] URGENTafter hours/weekend Endocrinologist on call

## 2020-10-15 ENCOUNTER — ANCILLARY PROCEDURE (OUTPATIENT)
Dept: ULTRASOUND IMAGING | Facility: CLINIC | Age: 35
End: 2020-10-15
Attending: OBSTETRICS & GYNECOLOGY
Payer: COMMERCIAL

## 2020-10-15 PROCEDURE — 76819 FETAL BIOPHYS PROFIL W/O NST: CPT | Mod: 26 | Performed by: OBSTETRICS & GYNECOLOGY

## 2020-10-15 PROCEDURE — 76819 FETAL BIOPHYS PROFIL W/O NST: CPT

## 2020-10-19 ENCOUNTER — ANCILLARY PROCEDURE (OUTPATIENT)
Dept: ULTRASOUND IMAGING | Facility: CLINIC | Age: 35
End: 2020-10-19
Attending: OBSTETRICS & GYNECOLOGY
Payer: COMMERCIAL

## 2020-10-19 PROCEDURE — 76819 FETAL BIOPHYS PROFIL W/O NST: CPT

## 2020-10-19 PROCEDURE — 76819 FETAL BIOPHYS PROFIL W/O NST: CPT | Mod: 26 | Performed by: OBSTETRICS & GYNECOLOGY

## 2020-10-21 NOTE — PROGRESS NOTES
PEDRO LUIS Visit 10/12/2020    S:  Reema Romano is a 35 year old female, , who is 36w6d. She is feeling well. She denies any vaginal bleeding or spotting, no vision changes, and no shortness of breath. She has baseline headaches, but has not had any changes in her headaches. She has also noticed some LE swelling when she is standing or sitting for a long time. NO current complaints.    Last Endo visit 10/13, increased Levemir 16U, 11U AC.     Fastings: 91-95  AM: 110s  Lunch: 120-124, usually above 120s  Dinner: >125, up to 135      O:  See OB Flowsheet    FH 36cm    BPP  Today  , MVP 5.6    35 year old  at 36w6d presents for PEDRO LUIS visit  1) PNC: Rh positive, Kala negative, Rubella immune, Infectious labs wnl.  2) Genetic screening: Declined, Normal Level II US, normal fetal echo due to Type 2 DM  3) Type II DM: Follows with endocrinology although minimal visits, last virtual 10/13/20. Sugars today elevated at lunch and dinner time. Current regimen: levemir 16U daily, Novolog 6U/U/11U, metformin 1000mg BID. Next Endo visit 10/27. Growth US q4w, last 20 with EFW 58%ile. Next on Monday 10/26. Normal fetal echo on 7/10/2020.  Getting twice weekly BPP, Today . On ASA 81mg daily for preeclampsia prophylaxis, had baseline labs with UPC 0.22. Plan IOL for diabetes likely at 37-39 weeks depending on sugar control. Scheduled today for  at 39w0d, if something is to change can move induction earlier.  4) History of  section via VML: Per patient, occurred in [place], was for failure to dilate past 1cm and 10 pound baby that pregnancy. Infraumbilical VML incision, she is unsure what the uterine incision is. She tried to get the records but was not able to. She was not told that she couldn't have a vaginal delivery.  The patient desires a TOLAC. TOLAC consent signed 2020.  Aware of limited options for cervical ripening if induction required and can continue to discuss moving forward. Discussed  induction options with TOLAC including pitocin, lugo, and AROM.   5) s/p tdap, flu shot    6) GBS negative  7) Covid19 positive 7/13, symptoms resolved. COVID test ordered for induction since this is >90 days since her last COVID test  8) RTC PEDRO LUIS visit in 1 week    Discussed with Dr. Morataya.     Mendoza Mabry MD PGY2  Obstetrics & Gynecology  10/21/20     The Patient was seen in [facility] Continuity Clinic by MENDOZA MABRY.  I reviewed the history & exam. Assessment and plan were jointly made.    Della Morataya MD

## 2020-10-22 ENCOUNTER — OFFICE VISIT (OUTPATIENT)
Dept: OBGYN | Facility: CLINIC | Age: 35
End: 2020-10-22
Attending: OBSTETRICS & GYNECOLOGY
Payer: COMMERCIAL

## 2020-10-22 ENCOUNTER — ANCILLARY PROCEDURE (OUTPATIENT)
Dept: ULTRASOUND IMAGING | Facility: CLINIC | Age: 35
End: 2020-10-22
Attending: OBSTETRICS & GYNECOLOGY
Payer: COMMERCIAL

## 2020-10-22 VITALS
HEART RATE: 77 BPM | DIASTOLIC BLOOD PRESSURE: 71 MMHG | BODY MASS INDEX: 38.55 KG/M2 | SYSTOLIC BLOOD PRESSURE: 116 MMHG | WEIGHT: 191 LBS

## 2020-10-22 DIAGNOSIS — O24.414 INSULIN CONTROLLED GESTATIONAL DIABETES MELLITUS (GDM) IN THIRD TRIMESTER: Primary | ICD-10-CM

## 2020-10-22 PROCEDURE — 76819 FETAL BIOPHYS PROFIL W/O NST: CPT | Mod: 26 | Performed by: OBSTETRICS & GYNECOLOGY

## 2020-10-22 PROCEDURE — 76819 FETAL BIOPHYS PROFIL W/O NST: CPT

## 2020-10-22 PROCEDURE — G0463 HOSPITAL OUTPT CLINIC VISIT: HCPCS | Mod: 25

## 2020-10-22 PROCEDURE — 99207 PR PRENATAL VISIT: CPT | Mod: GE | Performed by: OBSTETRICS & GYNECOLOGY

## 2020-10-22 ASSESSMENT — PAIN SCALES - GENERAL: PAINLEVEL: NO PAIN (0)

## 2020-10-22 NOTE — NURSING NOTE
Chief Complaint   Patient presents with     Prenatal Care     PEDRO LUIS 36 weeks and 6 days   Tania Pierce LPN

## 2020-10-22 NOTE — LETTER
10/22/2020       RE: Tanisha Valenzuela  [address] [facility] [address]     Dear Colleague,    Thank you for referring your patient, Tanisha Valenzuela, to the [facility] WOMEN'S CLINIC [place] at [facility]. Please see a copy of my visit note below.    PEDRO LUIS Visit 10/12/2020    S:  Reema Romano is a 35 year old female, , who is 36w6d. She is feeling well. She denies any vaginal bleeding or spotting, no vision changes, and no shortness of breath. She has baseline headaches, but has not had any changes in her headaches. She has also noticed some LE swelling when she is standing or sitting for a long time. NO current complaints.    Last Endo visit 10/13, increased Levemir 16U, 11U AC.     Fastings: 91-95  AM: 110s  Lunch: 120-124, usually above 120s  Dinner: >125, up to 135      O:  See OB Flowsheet    FH 36cm    BPP  Today  , MVP 5.6    35 year old  at 36w6d presents for PEDRO LUIS visit  1) PNC: Rh positive, Kala negative, Rubella immune, Infectious labs wnl.  2) Genetic screening: Declined, Normal Level II US, normal fetal echo due to Type 2 DM  3) Type II DM: Follows with endocrinology although minimal visits, last virtual 10/13/20. Sugars today elevated at lunch and dinner time. Current regimen: levemir 16U daily, Novolog 6U/9U/11U, metformin 1000mg BID. Next Endo visit 10/27. Growth US q4w, last 20 with EFW 58%ile. Next on Monday 10/26. Normal fetal echo on 7/10/2020.  Getting twice weekly BPP, Today . On ASA 81mg daily for preeclampsia prophylaxis, had baseline labs with UPC 0.22. Plan IOL for diabetes likely at 37-39 weeks depending on sugar control. Scheduled today for  at 39w0d, if something is to change can move induction earlier.  4) History of  section via VML: Per patient, occurred in [place], was for failure to dilate past 1cm and 10 pound baby that pregnancy. Infraumbilical VML incision, she is unsure what  the uterine incision is. She tried to get the records but was not able to. She was not told that she couldn't have a vaginal delivery.  The patient desires a TOLAC. TOLAC consent signed 9/28/2020.  Aware of limited options for cervical ripening if induction required and can continue to discuss moving forward. Discussed induction options with TOLAC including pitocin, lugo, and AROM.   5) s/p tdap, flu shot    6) GBS negative  7) Covid19 positive 7/13, symptoms resolved. COVID test ordered for induction since this is >90 days since her last COVID test  8) RTC PEDRO LUIS visit in 1 week    Discussed with Dr. Morataya.     Mendoza Mabry MD PGY2  Obstetrics & Gynecology  10/21/20     The Patient was seen in [facility] Clinic by MENDOZA MABRY.  I reviewed the history & exam. Assessment and plan were jointly made.    Della Morataya MD

## 2020-10-26 ENCOUNTER — ANCILLARY PROCEDURE (OUTPATIENT)
Dept: ULTRASOUND IMAGING | Facility: CLINIC | Age: 35
End: 2020-10-26
Attending: OBSTETRICS & GYNECOLOGY
Payer: COMMERCIAL

## 2020-10-26 DIAGNOSIS — O24.414 INSULIN CONTROLLED GESTATIONAL DIABETES MELLITUS (GDM) IN THIRD TRIMESTER: ICD-10-CM

## 2020-10-26 PROCEDURE — 76819 FETAL BIOPHYS PROFIL W/O NST: CPT | Mod: 26 | Performed by: OBSTETRICS & GYNECOLOGY

## 2020-10-26 PROCEDURE — 36415 COLL VENOUS BLD VENIPUNCTURE: CPT | Performed by: STUDENT IN AN ORGANIZED HEALTH CARE EDUCATION/TRAINING PROGRAM

## 2020-10-26 PROCEDURE — 76819 FETAL BIOPHYS PROFIL W/O NST: CPT

## 2020-10-26 NOTE — PROGRESS NOTES
"Outcome for 10/26/20 3:59 PM :Glucose data sent in [employer] Message (once she get home)    NO answer 4:35, 4:50    Tanisha Valenzuela is a 35 year old female who is being evaluated via a billable telephone visit.      The patient has been notified of following:     \"This telephone visit will be conducted via a call between you and your physician/provider. We have found that certain health care needs can be provided without the need for a physical exam.  This service lets us provide the care you need with a short phone conversation.  If a prescription is necessary we can send it directly to your pharmacy.  If lab work is needed we can place an order for that and you can then stop by our lab to have the test done at a later time.    Telephone visits are billed at different rates depending on your insurance coverage. During this emergency period, for some insurers they may be billed the same as an in-person visit.  Please reach out to your insurance provider with any questions.    If during the course of the call the physician/provider feels a telephone visit is not appropriate, you will not be charged for this service.\"    Patient has given verbal consent for Telephone visit?  Yes    What phone number would you like to be contacted at? [phone]    How would you like to obtain your AVS? [employer]    Phone call duration: 0 minutes    This patient was a no show for this scheduled appointment.  "

## 2020-10-27 ENCOUNTER — VIRTUAL VISIT (OUTPATIENT)
Dept: ENDOCRINOLOGY | Facility: CLINIC | Age: 35
End: 2020-10-27
Payer: COMMERCIAL

## 2020-10-27 DIAGNOSIS — Z53.9 NO SHOW: Primary | ICD-10-CM

## 2020-10-27 NOTE — LETTER
Date:November 1, 2020      Provider requested that no letter be sent. Do not send.       [facility] Health Information

## 2020-10-27 NOTE — LETTER
"10/27/2020       RE: Tanisha Valenzuela  [address] [facility] [address]     Dear Colleague,    Thank you for referring your patient, Tanisha Valenzuela, to the [facility] ENDOCRINOLOGY CLINIC [place] at [facility]. Please see a copy of my visit note below.    Outcome for 10/26/20 3:59 PM :Glucose data sent in [employer] Message (once she get home)    NO answer 4:35, 4:50    Tanisha Valenzuela is a 35 year old female who is being evaluated via a billable telephone visit.      The patient has been notified of following:     \"This telephone visit will be conducted via a call between you and your physician/provider. We have found that certain health care needs can be provided without the need for a physical exam.  This service lets us provide the care you need with a short phone conversation.  If a prescription is necessary we can send it directly to your pharmacy.  If lab work is needed we can place an order for that and you can then stop by our lab to have the test done at a later time.    Telephone visits are billed at different rates depending on your insurance coverage. During this emergency period, for some insurers they may be billed the same as an in-person visit.  Please reach out to your insurance provider with any questions.    If during the course of the call the physician/provider feels a telephone visit is not appropriate, you will not be charged for this service.\"    Patient has given verbal consent for Telephone visit?  Yes    What phone number would you like to be contacted at? [phone]    How would you like to obtain your AVS? [employer]    Phone call duration: 0 minutes    This patient was a no show for this scheduled appointment.      Again, thank you for allowing me to participate in the care of your patient.      Sincerely,    Hannah Patel PA-C      "

## 2020-10-28 LAB
COVID-19 SPIKE RBD ABY TITER: NORMAL
COVID-19 SPIKE RBD ABY: NORMAL

## 2020-10-28 NOTE — PROGRESS NOTES
PEDRO LUIS Visit 10/12/2020    S:  Reema Romano is a 35 year old female, , who is 37w6d. She is feeling well. She denies any vaginal bleeding or spotting, no vision changes, and no shortness of breath.     Last Endo visit 10/27, she missed this visit. She lost her glucometer last week on the bus and hasn't been able to check sugars. She got a new one today so will start again. She borrowed a meter and checked her BG 1hr postprandial and it was 74. She has had to move her belongings around the last two weeks due to construction so has been stressed.     O:  See OB Flowsheet    SVE C/L/H, external os closed    US Today   EFW 77%ile, read not available yet  FHT normal     35 year old  at 37w6d presents for PEDRO LUIS visit  1) PNC: Rh positive, Kala negative, Rubella immune, Infectious labs wnl.  2) Genetic screening: Declined, Normal Level II US, normal fetal echo due to Type 2 DM  3) Type II DM: Follows with endocrinology although minimal visits, last virtual 10/13/20. Sugars today elevated at lunch and dinner time. Current regimen: levemir 16U daily, Novolog 6U/9U/11U, metformin 1000mg BID. Next Endo visit 10/27. Growth US q4w, last 20 with EFW 58%ile. Next on Monday 10/26. Normal fetal echo on 7/10/2020.  Getting twice weekly BPP, Today . On ASA 81mg daily for preeclampsia prophylaxis, had baseline labs with UPC 0.22. Plan IOL for diabetes likely at 37-39 weeks depending on sugar control. Scheduled for  at 39w0d, if something is to change can move induction earlier.  4) History of  section via VML: Per patient, occurred in [place], was for failure to dilate past 1cm and 10 pound baby that pregnancy. Infraumbilical VML incision, she is unsure what the uterine incision is. She tried to get the records but was not able to. She was not told that she couldn't have a vaginal delivery.  The patient desires a TOLAC. TOLAC consent signed 2020.  Aware of limited options for cervical ripening if induction  required and can continue to discuss moving forward. Discussed induction options with TOLAC including pitocin, lugo, and AROM. SVE today closed.   5) s/p tdap, flu shot    6) GBS negative  7) BC plan: Undecided, did not get pregnant for 16 years not on birth control, has never used anything before. Discussed options, she will think about it  8) Covid19 positive 7/13, symptoms resolved. COVID test ordered for induction since this is >90 days since her last COVID test, however she obtained this on 10/26. She will have to repeat this 3 days prior to her IOL 11/6. COVID test re-ordered  9) Return for IOL 11/6    Discussed with Dr. Hall.     Zonia Mabry MD PGY2  Obstetrics & Gynecology  10/21/20     The patient was seen in resident continuity clinic by Dr. Mabry.  I have reviewed the history and exam, the assessment and plan were jointly made.     Chanelle Hall MD, FACOG

## 2020-10-29 ENCOUNTER — OFFICE VISIT (OUTPATIENT)
Dept: OBGYN | Facility: CLINIC | Age: 35
End: 2020-10-29
Attending: STUDENT IN AN ORGANIZED HEALTH CARE EDUCATION/TRAINING PROGRAM
Payer: COMMERCIAL

## 2020-10-29 ENCOUNTER — ANCILLARY PROCEDURE (OUTPATIENT)
Dept: ULTRASOUND IMAGING | Facility: CLINIC | Age: 35
End: 2020-10-29
Attending: OBSTETRICS & GYNECOLOGY
Payer: COMMERCIAL

## 2020-10-29 VITALS
BODY MASS INDEX: 38.51 KG/M2 | SYSTOLIC BLOOD PRESSURE: 114 MMHG | WEIGHT: 191 LBS | DIASTOLIC BLOOD PRESSURE: 70 MMHG | HEIGHT: 59 IN | HEART RATE: 74 BPM

## 2020-10-29 DIAGNOSIS — O34.219 PREVIOUS CESAREAN DELIVERY, ANTEPARTUM CONDITION OR COMPLICATION: ICD-10-CM

## 2020-10-29 DIAGNOSIS — O09.893 SUPERVISION OF OTHER HIGH RISK PREGNANCIES, THIRD TRIMESTER: Primary | ICD-10-CM

## 2020-10-29 PROCEDURE — 76819 FETAL BIOPHYS PROFIL W/O NST: CPT

## 2020-10-29 PROCEDURE — 99207 PR PRENATAL VISIT: CPT | Mod: GE | Performed by: OBSTETRICS & GYNECOLOGY

## 2020-10-29 PROCEDURE — 76819 FETAL BIOPHYS PROFIL W/O NST: CPT | Mod: 26 | Performed by: OBSTETRICS & GYNECOLOGY

## 2020-10-29 PROCEDURE — 76816 OB US FOLLOW-UP PER FETUS: CPT | Mod: 26 | Performed by: OBSTETRICS & GYNECOLOGY

## 2020-10-29 ASSESSMENT — PAIN SCALES - GENERAL: PAINLEVEL: NO PAIN (0)

## 2020-10-29 ASSESSMENT — MIFFLIN-ST. JEOR: SCORE: 1472

## 2020-10-29 NOTE — LETTER
10/29/2020       RE: Tanisha Valenzuela  [address] [facility] [address]     Dear Colleague,    Thank you for referring your patient, Tanisha Valenzuela, to the [facility] [place] at [facility]. Please see a copy of my visit note below.    PEDRO LUIS Visit 10/12/2020    S:  Reema Romano is a 35 year old female, , who is 37w6d. She is feeling well. She denies any vaginal bleeding or spotting, no vision changes, and no shortness of breath.     Last Endo visit 10/27, she missed this visit. She lost her glucometer last week on the bus and hasn't been able to check sugars. She got a new one today so will start again. She borrowed a meter and checked her BG 1hr postprandial and it was 74. She has had to move her belongings around the last two weeks due to construction so has been stressed.     O:  See OB Flowsheet    SVE C/L/H, external os closed    US Today   EFW 77%ile, read not available yet  FHT normal     35 year old  at 37w6d presents for PEDRO LUIS visit  1) PNC: Rh positive, Kala negative, Rubella immune, Infectious labs wnl.  2) Genetic screening: Declined, Normal Level II US, normal fetal echo due to Type 2 DM  3) Type II DM: Follows with endocrinology although minimal visits, last virtual 10/13/20. Sugars today elevated at lunch and dinner time. Current regimen: levemir 16U daily, Novolog 6U/9U/11U, metformin 1000mg BID. Next Endo visit 10/27. Growth US q4w, last 20 with EFW 58%ile. Next on Monday 10/26. Normal fetal echo on 7/10/2020.  Getting twice weekly BPP, Today . On ASA 81mg daily for preeclampsia prophylaxis, had baseline labs with UPC 0.22. Plan IOL for diabetes likely at 37-39 weeks depending on sugar control. Scheduled for  at 39w0d, if something is to change can move induction earlier.  4) History of  section via VML: Per patient, occurred in [place], was for failure to dilate past 1cm and 10 pound baby  that pregnancy. Infraumbilical VML incision, she is unsure what the uterine incision is. She tried to get the records but was not able to. She was not told that she couldn't have a vaginal delivery.  The patient desires a TOLAC. TOLAC consent signed 9/28/2020.  Aware of limited options for cervical ripening if induction required and can continue to discuss moving forward. Discussed induction options with TOLAC including pitocin, lugo, and AROM. SVE today closed.   5) s/p tdap, flu shot    6) GBS negative  7) BC plan: Undecided, did not get pregnant for 16 years not on birth control, has never used anything before. Discussed options, she will think about it  8) Covid19 positive 7/13, symptoms resolved. COVID test ordered for induction since this is >90 days since her last COVID test, however she obtained this on 10/26. She will have to repeat this 3 days prior to her IOL 11/6. COVID test re-ordered  9) Return for IOL 11/6    Discussed with Dr. Hall.     Zonia Mabry MD PGY2  Obstetrics & Gynecology  10/21/20     The patient was seen in resident continuity clinic by Dr. Mabry.  I have reviewed the history and exam, the assessment and plan were jointly made.     Chanelle Hall MD, FACOG

## 2020-11-03 DIAGNOSIS — O09.90 SUPERVISION OF HIGH RISK PREGNANCY, ANTEPARTUM: ICD-10-CM

## 2020-11-03 PROCEDURE — U0003 INFECTIOUS AGENT DETECTION BY NUCLEIC ACID (DNA OR RNA); SEVERE ACUTE RESPIRATORY SYNDROME CORONAVIRUS 2 (SARS-COV-2) (CORONAVIRUS DISEASE [COVID-19]), AMPLIFIED PROBE TECHNIQUE, MAKING USE OF HIGH THROUGHPUT TECHNOLOGIES AS DESCRIBED BY CMS-2020-01-R: HCPCS | Performed by: STUDENT IN AN ORGANIZED HEALTH CARE EDUCATION/TRAINING PROGRAM

## 2020-11-04 LAB
SARS-COV-2 RNA SPEC QL NAA+PROBE: NOT DETECTED
SPECIMEN SOURCE: NORMAL

## 2020-11-06 ENCOUNTER — HOSPITAL ENCOUNTER (INPATIENT)
Facility: CLINIC | Age: 35
LOS: 3 days | Discharge: HOME | End: 2020-11-09
Attending: OBSTETRICS & GYNECOLOGY | Admitting: OBSTETRICS & GYNECOLOGY
Payer: COMMERCIAL

## 2020-11-06 ENCOUNTER — ANESTHESIA (OUTPATIENT)
Dept: OBGYN | Facility: CLINIC | Age: 35
End: 2020-11-06
Payer: COMMERCIAL

## 2020-11-06 ENCOUNTER — ANCILLARY PROCEDURE (OUTPATIENT)
Dept: ULTRASOUND IMAGING | Facility: CLINIC | Age: 35
End: 2020-11-06
Payer: COMMERCIAL

## 2020-11-06 ENCOUNTER — ANESTHESIA EVENT (OUTPATIENT)
Dept: OBGYN | Facility: CLINIC | Age: 35
End: 2020-11-06
Payer: COMMERCIAL

## 2020-11-06 DIAGNOSIS — R80.9 TYPE 2 DIABETES MELLITUS WITH MICROALBUMINURIA, UNSPECIFIED WHETHER LONG TERM INSULIN USE: ICD-10-CM

## 2020-11-06 DIAGNOSIS — E11.29 TYPE 2 DIABETES MELLITUS WITH MICROALBUMINURIA, UNSPECIFIED WHETHER LONG TERM INSULIN USE: ICD-10-CM

## 2020-11-06 PROBLEM — Z98.891 HISTORY OF CESAREAN DELIVERY: Status: ACTIVE | Noted: 2020-11-06

## 2020-11-06 LAB
ABO + RH BLD: NORMAL
ABO + RH BLD: NORMAL
BLD GP AB SCN SERPL QL: NORMAL
BLOOD BANK CMNT PATIENT-IMP: NORMAL
CREAT SERPL-MCNC: 0.62 MG/DL (ref 0.52–1.04)
GFR SERPL CREATININE-BSD FRML MDRD: >90 ML/MIN/{1.73_M2}
GLUCOSE BLDC GLUCOMTR-MCNC: 71 MG/DL (ref 70–99)
GLUCOSE BLDC GLUCOMTR-MCNC: 81 MG/DL (ref 70–99)
GLUCOSE BLDC GLUCOMTR-MCNC: 83 MG/DL (ref 70–99)
GLUCOSE BLDC GLUCOMTR-MCNC: 88 MG/DL (ref 70–99)
HGB BLD-MCNC: 11.5 G/DL (ref 11.7–15.7)
PLATELET # BLD AUTO: 225 10E9/L (ref 150–450)
SPECIMEN EXP DATE BLD: NORMAL
T PALLIDUM AB SER QL: NONREACTIVE

## 2020-11-06 PROCEDURE — 82565 ASSAY OF CREATININE: CPT | Performed by: STUDENT IN AN ORGANIZED HEALTH CARE EDUCATION/TRAINING PROGRAM

## 2020-11-06 PROCEDURE — 272N000001 HC OR GENERAL SUPPLY STERILE: Performed by: OBSTETRICS & GYNECOLOGY

## 2020-11-06 PROCEDURE — 250N000011 HC RX IP 250 OP 636: Performed by: STUDENT IN AN ORGANIZED HEALTH CARE EDUCATION/TRAINING PROGRAM

## 2020-11-06 PROCEDURE — 250N000009 HC RX 250: Performed by: STUDENT IN AN ORGANIZED HEALTH CARE EDUCATION/TRAINING PROGRAM

## 2020-11-06 PROCEDURE — 36415 COLL VENOUS BLD VENIPUNCTURE: CPT | Performed by: STUDENT IN AN ORGANIZED HEALTH CARE EDUCATION/TRAINING PROGRAM

## 2020-11-06 PROCEDURE — 271N000001 HC OR GENERAL SUPPLY NON-STERILE: Performed by: OBSTETRICS & GYNECOLOGY

## 2020-11-06 PROCEDURE — 85049 AUTOMATED PLATELET COUNT: CPT | Performed by: OBSTETRICS & GYNECOLOGY

## 2020-11-06 PROCEDURE — 999N001017 HC STATISTIC GLUCOSE BY METER IP

## 2020-11-06 PROCEDURE — 250N000011 HC RX IP 250 OP 636

## 2020-11-06 PROCEDURE — 120N000002 HC R&B MED SURG/OB UMMC

## 2020-11-06 PROCEDURE — 370N000001 HC ANESTHESIA TECHNICAL FEE, 1ST 30 MIN: Performed by: OBSTETRICS & GYNECOLOGY

## 2020-11-06 PROCEDURE — 86850 RBC ANTIBODY SCREEN: CPT | Performed by: OBSTETRICS & GYNECOLOGY

## 2020-11-06 PROCEDURE — 82947 ASSAY GLUCOSE BLOOD QUANT: CPT | Performed by: STUDENT IN AN ORGANIZED HEALTH CARE EDUCATION/TRAINING PROGRAM

## 2020-11-06 PROCEDURE — 360N000022 HC SURGERY LEVEL 3 1ST 30 MIN - UMMC: Performed by: OBSTETRICS & GYNECOLOGY

## 2020-11-06 PROCEDURE — 258N000003 HC RX IP 258 OP 636

## 2020-11-06 PROCEDURE — 360N000023 HC SURGERY LEVEL 3 EA 15 ADDTL MIN UMMC: Performed by: OBSTETRICS & GYNECOLOGY

## 2020-11-06 PROCEDURE — 86901 BLOOD TYPING SEROLOGIC RH(D): CPT | Performed by: OBSTETRICS & GYNECOLOGY

## 2020-11-06 PROCEDURE — 3E0T3BZ INTRODUCTION OF ANESTHETIC AGENT INTO PERIPHERAL NERVES AND PLEXI, PERCUTANEOUS APPROACH: ICD-10-PCS | Performed by: STUDENT IN AN ORGANIZED HEALTH CARE EDUCATION/TRAINING PROGRAM

## 2020-11-06 PROCEDURE — 85018 HEMOGLOBIN: CPT | Performed by: OBSTETRICS & GYNECOLOGY

## 2020-11-06 PROCEDURE — 86900 BLOOD TYPING SEROLOGIC ABO: CPT | Performed by: OBSTETRICS & GYNECOLOGY

## 2020-11-06 PROCEDURE — 86780 TREPONEMA PALLIDUM: CPT | Performed by: OBSTETRICS & GYNECOLOGY

## 2020-11-06 PROCEDURE — 761N000004 HC RECOVERY PHASE 1 LEVEL 2 EA ADDTL HR: Performed by: OBSTETRICS & GYNECOLOGY

## 2020-11-06 PROCEDURE — 258N000003 HC RX IP 258 OP 636: Performed by: OBSTETRICS & GYNECOLOGY

## 2020-11-06 PROCEDURE — 250N000009 HC RX 250

## 2020-11-06 PROCEDURE — 370N000002 HC ANESTHESIA TECHNICAL FEE, EACH ADDTL 15 MIN: Performed by: OBSTETRICS & GYNECOLOGY

## 2020-11-06 PROCEDURE — 761N000003 HC RECOVERY PHASE 1 LEVEL 2 FIRST HR: Performed by: OBSTETRICS & GYNECOLOGY

## 2020-11-06 PROCEDURE — 999N000139 HC STATISTIC PRE-PROCEDURE ASSESSMENT II: Performed by: OBSTETRICS & GYNECOLOGY

## 2020-11-06 RX ORDER — ACETAMINOPHEN 325 MG/1
650 TABLET ORAL EVERY 4 HOURS PRN
Status: DISCONTINUED | OUTPATIENT
Start: 2020-11-09 | End: 2020-11-09 | Stop reason: HOSPADM

## 2020-11-06 RX ORDER — FLUMAZENIL 0.1 MG/ML
0.2 INJECTION, SOLUTION INTRAVENOUS
Status: DISCONTINUED | OUTPATIENT
Start: 2020-11-06 | End: 2020-11-06

## 2020-11-06 RX ORDER — OXYTOCIN/0.9 % SODIUM CHLORIDE 30/500 ML
340 PLASTIC BAG, INJECTION (ML) INTRAVENOUS CONTINUOUS PRN
Status: DISCONTINUED | OUTPATIENT
Start: 2020-11-06 | End: 2020-11-09 | Stop reason: HOSPADM

## 2020-11-06 RX ORDER — AMOXICILLIN 250 MG
1 CAPSULE ORAL 2 TIMES DAILY
Status: DISCONTINUED | OUTPATIENT
Start: 2020-11-06 | End: 2020-11-09 | Stop reason: HOSPADM

## 2020-11-06 RX ORDER — ONDANSETRON 2 MG/ML
4 INJECTION INTRAMUSCULAR; INTRAVENOUS EVERY 6 HOURS PRN
Status: DISCONTINUED | OUTPATIENT
Start: 2020-11-06 | End: 2020-11-06

## 2020-11-06 RX ORDER — AMOXICILLIN 250 MG
2 CAPSULE ORAL 2 TIMES DAILY
Status: DISCONTINUED | OUTPATIENT
Start: 2020-11-06 | End: 2020-11-09 | Stop reason: HOSPADM

## 2020-11-06 RX ORDER — DEXTROSE MONOHYDRATE 25 G/50ML
25-50 INJECTION, SOLUTION INTRAVENOUS
Status: DISCONTINUED | OUTPATIENT
Start: 2020-11-06 | End: 2020-11-08

## 2020-11-06 RX ORDER — IBUPROFEN 800 MG/1
800 TABLET, FILM COATED ORAL EVERY 6 HOURS PRN
Status: DISCONTINUED | OUTPATIENT
Start: 2020-11-06 | End: 2020-11-09 | Stop reason: HOSPADM

## 2020-11-06 RX ORDER — CITRIC ACID/SODIUM CITRATE 334-500MG
30 SOLUTION, ORAL ORAL
Status: DISCONTINUED | OUTPATIENT
Start: 2020-11-06 | End: 2020-11-06

## 2020-11-06 RX ORDER — CITRIC ACID/SODIUM CITRATE 334-500MG
SOLUTION, ORAL ORAL
Status: DISCONTINUED
Start: 2020-11-06 | End: 2020-11-06 | Stop reason: HOSPADM

## 2020-11-06 RX ORDER — MEPERIDINE HYDROCHLORIDE 25 MG/ML
12.5 INJECTION INTRAMUSCULAR; INTRAVENOUS; SUBCUTANEOUS EVERY 5 MIN PRN
Status: DISCONTINUED | OUTPATIENT
Start: 2020-11-06 | End: 2020-11-06 | Stop reason: HOSPADM

## 2020-11-06 RX ORDER — CEFAZOLIN SODIUM 1 G/3ML
1 INJECTION, POWDER, FOR SOLUTION INTRAMUSCULAR; INTRAVENOUS SEE ADMIN INSTRUCTIONS
Status: DISCONTINUED | OUTPATIENT
Start: 2020-11-06 | End: 2020-11-06

## 2020-11-06 RX ORDER — HYDROCORTISONE 2.5 %
CREAM (GRAM) TOPICAL 3 TIMES DAILY PRN
Status: DISCONTINUED | OUTPATIENT
Start: 2020-11-06 | End: 2020-11-09 | Stop reason: HOSPADM

## 2020-11-06 RX ORDER — NICOTINE POLACRILEX 4 MG
15-30 LOZENGE BUCCAL
Status: DISCONTINUED | OUTPATIENT
Start: 2020-11-06 | End: 2020-11-08

## 2020-11-06 RX ORDER — SODIUM CHLORIDE, SODIUM LACTATE, POTASSIUM CHLORIDE, CALCIUM CHLORIDE 600; 310; 30; 20 MG/100ML; MG/100ML; MG/100ML; MG/100ML
INJECTION, SOLUTION INTRAVENOUS CONTINUOUS
Status: DISCONTINUED | OUTPATIENT
Start: 2020-11-06 | End: 2020-11-06 | Stop reason: HOSPADM

## 2020-11-06 RX ORDER — OXYTOCIN/0.9 % SODIUM CHLORIDE 30/500 ML
PLASTIC BAG, INJECTION (ML) INTRAVENOUS CONTINUOUS PRN
Status: DISCONTINUED | OUTPATIENT
Start: 2020-11-06 | End: 2020-11-06

## 2020-11-06 RX ORDER — OXYTOCIN/0.9 % SODIUM CHLORIDE 30/500 ML
100 PLASTIC BAG, INJECTION (ML) INTRAVENOUS CONTINUOUS
Status: DISCONTINUED | OUTPATIENT
Start: 2020-11-06 | End: 2020-11-09 | Stop reason: HOSPADM

## 2020-11-06 RX ORDER — LIDOCAINE 40 MG/G
CREAM TOPICAL
Status: DISCONTINUED | OUTPATIENT
Start: 2020-11-06 | End: 2020-11-06

## 2020-11-06 RX ORDER — KETOROLAC TROMETHAMINE 30 MG/ML
30 INJECTION, SOLUTION INTRAMUSCULAR; INTRAVENOUS EVERY 6 HOURS
Status: COMPLETED | OUTPATIENT
Start: 2020-11-07 | End: 2020-11-07

## 2020-11-06 RX ORDER — KETOROLAC TROMETHAMINE 30 MG/ML
INJECTION, SOLUTION INTRAMUSCULAR; INTRAVENOUS PRN
Status: DISCONTINUED | OUTPATIENT
Start: 2020-11-06 | End: 2020-11-06

## 2020-11-06 RX ORDER — SIMETHICONE 80 MG
80 TABLET,CHEWABLE ORAL 4 TIMES DAILY PRN
Status: DISCONTINUED | OUTPATIENT
Start: 2020-11-06 | End: 2020-11-09 | Stop reason: HOSPADM

## 2020-11-06 RX ORDER — SODIUM CHLORIDE, SODIUM LACTATE, POTASSIUM CHLORIDE, CALCIUM CHLORIDE 600; 310; 30; 20 MG/100ML; MG/100ML; MG/100ML; MG/100ML
INJECTION, SOLUTION INTRAVENOUS CONTINUOUS PRN
Status: DISCONTINUED | OUTPATIENT
Start: 2020-11-06 | End: 2020-11-06

## 2020-11-06 RX ORDER — LIDOCAINE 40 MG/G
CREAM TOPICAL
Status: DISCONTINUED | OUTPATIENT
Start: 2020-11-06 | End: 2020-11-09 | Stop reason: HOSPADM

## 2020-11-06 RX ORDER — IBUPROFEN 800 MG/1
800 TABLET, FILM COATED ORAL
Status: DISCONTINUED | OUTPATIENT
Start: 2020-11-06 | End: 2020-11-06

## 2020-11-06 RX ORDER — METHYLERGONOVINE MALEATE 0.2 MG/ML
200 INJECTION INTRAVENOUS
Status: DISCONTINUED | OUTPATIENT
Start: 2020-11-06 | End: 2020-11-09 | Stop reason: HOSPADM

## 2020-11-06 RX ORDER — ONDANSETRON 2 MG/ML
4 INJECTION INTRAMUSCULAR; INTRAVENOUS EVERY 6 HOURS PRN
Status: DISCONTINUED | OUTPATIENT
Start: 2020-11-06 | End: 2020-11-09 | Stop reason: HOSPADM

## 2020-11-06 RX ORDER — ACETAMINOPHEN 325 MG/1
975 TABLET ORAL EVERY 6 HOURS
Status: DISCONTINUED | OUTPATIENT
Start: 2020-11-06 | End: 2020-11-09 | Stop reason: HOSPADM

## 2020-11-06 RX ORDER — CEFAZOLIN SODIUM 2 G/100ML
2 INJECTION, SOLUTION INTRAVENOUS
Status: COMPLETED | OUTPATIENT
Start: 2020-11-06 | End: 2020-11-06

## 2020-11-06 RX ORDER — ONDANSETRON 4 MG/1
4 TABLET, ORALLY DISINTEGRATING ORAL EVERY 30 MIN PRN
Status: DISCONTINUED | OUTPATIENT
Start: 2020-11-06 | End: 2020-11-06 | Stop reason: HOSPADM

## 2020-11-06 RX ORDER — FENTANYL CITRATE 50 UG/ML
INJECTION, SOLUTION INTRAMUSCULAR; INTRAVENOUS PRN
Status: DISCONTINUED | OUTPATIENT
Start: 2020-11-06 | End: 2020-11-06

## 2020-11-06 RX ORDER — METHYLERGONOVINE MALEATE 0.2 MG/ML
INJECTION INTRAVENOUS PRN
Status: DISCONTINUED | OUTPATIENT
Start: 2020-11-06 | End: 2020-11-06

## 2020-11-06 RX ORDER — DEXTROSE, SODIUM CHLORIDE, SODIUM LACTATE, POTASSIUM CHLORIDE, AND CALCIUM CHLORIDE 5; .6; .31; .03; .02 G/100ML; G/100ML; G/100ML; G/100ML; G/100ML
INJECTION, SOLUTION INTRAVENOUS CONTINUOUS
Status: DISCONTINUED | OUTPATIENT
Start: 2020-11-06 | End: 2020-11-07

## 2020-11-06 RX ORDER — OXYCODONE HYDROCHLORIDE 5 MG/1
5 TABLET ORAL EVERY 4 HOURS PRN
Status: DISCONTINUED | OUTPATIENT
Start: 2020-11-06 | End: 2020-11-09 | Stop reason: HOSPADM

## 2020-11-06 RX ORDER — DEXTROSE MONOHYDRATE 25 G/50ML
25-50 INJECTION, SOLUTION INTRAVENOUS
Status: DISCONTINUED | OUTPATIENT
Start: 2020-11-06 | End: 2020-11-06

## 2020-11-06 RX ORDER — DEXTROSE, SODIUM CHLORIDE, SODIUM LACTATE, POTASSIUM CHLORIDE, AND CALCIUM CHLORIDE 5; .6; .31; .03; .02 G/100ML; G/100ML; G/100ML; G/100ML; G/100ML
INJECTION, SOLUTION INTRAVENOUS
Status: COMPLETED
Start: 2020-11-06 | End: 2020-11-06

## 2020-11-06 RX ORDER — BUPIVACAINE HYDROCHLORIDE 7.5 MG/ML
INJECTION, SOLUTION INTRASPINAL PRN
Status: DISCONTINUED | OUTPATIENT
Start: 2020-11-06 | End: 2020-11-06

## 2020-11-06 RX ORDER — ONDANSETRON 2 MG/ML
4 INJECTION INTRAMUSCULAR; INTRAVENOUS EVERY 30 MIN PRN
Status: DISCONTINUED | OUTPATIENT
Start: 2020-11-06 | End: 2020-11-06 | Stop reason: HOSPADM

## 2020-11-06 RX ORDER — ACETAMINOPHEN 325 MG/1
650 TABLET ORAL EVERY 4 HOURS PRN
Status: DISCONTINUED | OUTPATIENT
Start: 2020-11-06 | End: 2020-11-06

## 2020-11-06 RX ORDER — NALOXONE HYDROCHLORIDE 0.4 MG/ML
.1-.4 INJECTION, SOLUTION INTRAMUSCULAR; INTRAVENOUS; SUBCUTANEOUS
Status: DISCONTINUED | OUTPATIENT
Start: 2020-11-06 | End: 2020-11-06

## 2020-11-06 RX ORDER — SODIUM CHLORIDE, SODIUM LACTATE, POTASSIUM CHLORIDE, CALCIUM CHLORIDE 600; 310; 30; 20 MG/100ML; MG/100ML; MG/100ML; MG/100ML
INJECTION, SOLUTION INTRAVENOUS CONTINUOUS
Status: DISCONTINUED | OUTPATIENT
Start: 2020-11-06 | End: 2020-11-06

## 2020-11-06 RX ORDER — NICOTINE POLACRILEX 4 MG
15-30 LOZENGE BUCCAL
Status: DISCONTINUED | OUTPATIENT
Start: 2020-11-06 | End: 2020-11-06

## 2020-11-06 RX ORDER — NALOXONE HYDROCHLORIDE 0.4 MG/ML
.1-.4 INJECTION, SOLUTION INTRAMUSCULAR; INTRAVENOUS; SUBCUTANEOUS
Status: ACTIVE | OUTPATIENT
Start: 2020-11-06 | End: 2020-11-07

## 2020-11-06 RX ORDER — OXYTOCIN/0.9 % SODIUM CHLORIDE 30/500 ML
100-340 PLASTIC BAG, INJECTION (ML) INTRAVENOUS CONTINUOUS PRN
Status: DISCONTINUED | OUTPATIENT
Start: 2020-11-06 | End: 2020-11-06

## 2020-11-06 RX ORDER — OXYTOCIN/0.9 % SODIUM CHLORIDE 30/500 ML
PLASTIC BAG, INJECTION (ML) INTRAVENOUS
Status: DISPENSED
Start: 2020-11-06 | End: 2020-11-07

## 2020-11-06 RX ORDER — MODIFIED LANOLIN
OINTMENT (GRAM) TOPICAL
Status: DISCONTINUED | OUTPATIENT
Start: 2020-11-06 | End: 2020-11-09 | Stop reason: HOSPADM

## 2020-11-06 RX ORDER — FENTANYL CITRATE 50 UG/ML
25-50 INJECTION, SOLUTION INTRAMUSCULAR; INTRAVENOUS
Status: DISCONTINUED | OUTPATIENT
Start: 2020-11-06 | End: 2020-11-06

## 2020-11-06 RX ORDER — FENTANYL CITRATE-0.9 % NACL/PF 10 MCG/ML
PLASTIC BAG, INJECTION (ML) INTRAVENOUS CONTINUOUS PRN
Status: DISCONTINUED | OUTPATIENT
Start: 2020-11-06 | End: 2020-11-06

## 2020-11-06 RX ORDER — DEXTROSE, SODIUM CHLORIDE, SODIUM LACTATE, POTASSIUM CHLORIDE, AND CALCIUM CHLORIDE 5; .6; .31; .03; .02 G/100ML; G/100ML; G/100ML; G/100ML; G/100ML
INJECTION, SOLUTION INTRAVENOUS CONTINUOUS
Status: DISCONTINUED | OUTPATIENT
Start: 2020-11-06 | End: 2020-11-06

## 2020-11-06 RX ORDER — BISACODYL 10 MG
10 SUPPOSITORY, RECTAL RECTAL DAILY PRN
Status: DISCONTINUED | OUTPATIENT
Start: 2020-11-08 | End: 2020-11-09 | Stop reason: HOSPADM

## 2020-11-06 RX ORDER — BUPIVACAINE HYDROCHLORIDE 2.5 MG/ML
INJECTION, SOLUTION EPIDURAL; INFILTRATION; INTRACAUDAL PRN
Status: DISCONTINUED | OUTPATIENT
Start: 2020-11-06 | End: 2020-11-06

## 2020-11-06 RX ORDER — OXYTOCIN 10 [USP'U]/ML
10 INJECTION, SOLUTION INTRAMUSCULAR; INTRAVENOUS
Status: DISCONTINUED | OUTPATIENT
Start: 2020-11-06 | End: 2020-11-06

## 2020-11-06 RX ORDER — METHYLERGONOVINE MALEATE 0.2 MG/ML
200 INJECTION INTRAVENOUS
Status: DISCONTINUED | OUTPATIENT
Start: 2020-11-06 | End: 2020-11-06

## 2020-11-06 RX ORDER — CARBOPROST TROMETHAMINE 250 UG/ML
250 INJECTION, SOLUTION INTRAMUSCULAR
Status: DISCONTINUED | OUTPATIENT
Start: 2020-11-06 | End: 2020-11-09 | Stop reason: HOSPADM

## 2020-11-06 RX ORDER — MISOPROSTOL 200 UG/1
800 TABLET ORAL
Status: DISCONTINUED | OUTPATIENT
Start: 2020-11-06 | End: 2020-11-09 | Stop reason: HOSPADM

## 2020-11-06 RX ORDER — MORPHINE SULFATE 1 MG/ML
INJECTION, SOLUTION EPIDURAL; INTRATHECAL; INTRAVENOUS PRN
Status: DISCONTINUED | OUTPATIENT
Start: 2020-11-06 | End: 2020-11-06

## 2020-11-06 RX ORDER — ONDANSETRON 2 MG/ML
INJECTION INTRAMUSCULAR; INTRAVENOUS PRN
Status: DISCONTINUED | OUTPATIENT
Start: 2020-11-06 | End: 2020-11-06

## 2020-11-06 RX ORDER — OXYCODONE AND ACETAMINOPHEN 5; 325 MG/1; MG/1
1 TABLET ORAL
Status: DISCONTINUED | OUTPATIENT
Start: 2020-11-06 | End: 2020-11-06

## 2020-11-06 RX ORDER — CARBOPROST TROMETHAMINE 250 UG/ML
250 INJECTION, SOLUTION INTRAMUSCULAR
Status: DISCONTINUED | OUTPATIENT
Start: 2020-11-06 | End: 2020-11-06

## 2020-11-06 RX ORDER — LABETALOL 20 MG/4 ML (5 MG/ML) INTRAVENOUS SYRINGE
10
Status: DISCONTINUED | OUTPATIENT
Start: 2020-11-06 | End: 2020-11-06 | Stop reason: HOSPADM

## 2020-11-06 RX ORDER — OXYTOCIN 10 [USP'U]/ML
10 INJECTION, SOLUTION INTRAMUSCULAR; INTRAVENOUS
Status: DISCONTINUED | OUTPATIENT
Start: 2020-11-06 | End: 2020-11-09 | Stop reason: HOSPADM

## 2020-11-06 RX ADMIN — Medication 100 ML/HR: at 23:24

## 2020-11-06 RX ADMIN — OXYTOCIN-SODIUM CHLORIDE 0.9% IV SOLN 30 UNIT/500ML 300 ML/HR: 30-0.9/5 SOLUTION at 20:41

## 2020-11-06 RX ADMIN — BUPIVACAINE HYDROCHLORIDE IN DEXTROSE 1.5 ML: 7.5 INJECTION, SOLUTION SUBARACHNOID at 20:20

## 2020-11-06 RX ADMIN — SODIUM CHLORIDE, POTASSIUM CHLORIDE, SODIUM LACTATE AND CALCIUM CHLORIDE: 600; 310; 30; 20 INJECTION, SOLUTION INTRAVENOUS at 19:39

## 2020-11-06 RX ADMIN — ONDANSETRON 4 MG: 2 INJECTION INTRAMUSCULAR; INTRAVENOUS at 20:47

## 2020-11-06 RX ADMIN — Medication 2 MG: at 20:23

## 2020-11-06 RX ADMIN — FENTANYL CITRATE 15 MCG: 50 INJECTION, SOLUTION INTRAMUSCULAR; INTRAVENOUS at 20:20

## 2020-11-06 RX ADMIN — MORPHINE SULFATE 0.1 MG: 1 INJECTION EPIDURAL; INTRATHECAL; INTRAVENOUS at 20:20

## 2020-11-06 RX ADMIN — SODIUM CHLORIDE, POTASSIUM CHLORIDE, SODIUM LACTATE AND CALCIUM CHLORIDE: 600; 310; 30; 20 INJECTION, SOLUTION INTRAVENOUS at 20:07

## 2020-11-06 RX ADMIN — DEXTROSE, SODIUM CHLORIDE, SODIUM LACTATE, POTASSIUM CHLORIDE, AND CALCIUM CHLORIDE: 5; .6; .31; .03; .02 INJECTION, SOLUTION INTRAVENOUS at 16:46

## 2020-11-06 RX ADMIN — BUPIVACAINE HYDROCHLORIDE 20 ML: 2.5 INJECTION, SOLUTION EPIDURAL; INFILTRATION; INTRACAUDAL at 21:22

## 2020-11-06 RX ADMIN — SODIUM CHLORIDE, SODIUM LACTATE, POTASSIUM CHLORIDE, CALCIUM CHLORIDE AND DEXTROSE MONOHYDRATE: 5; 600; 310; 30; 20 INJECTION, SOLUTION INTRAVENOUS at 16:46

## 2020-11-06 RX ADMIN — KETOROLAC TROMETHAMINE 30 MG: 30 INJECTION, SOLUTION INTRAMUSCULAR at 21:07

## 2020-11-06 RX ADMIN — Medication 50 MCG/MIN: at 20:20

## 2020-11-06 RX ADMIN — METHYLERGONOVINE MALEATE 200 MCG: 0.2 INJECTION INTRAMUSCULAR; INTRAVENOUS at 20:46

## 2020-11-06 ASSESSMENT — ACTIVITIES OF DAILY LIVING (ADL)
WEAR_GLASSES_OR_BLIND: NO
TOILETING_ISSUES: NO
FALL_HISTORY_WITHIN_LAST_SIX_MONTHS: NO

## 2020-11-06 ASSESSMENT — COPD QUESTIONNAIRES: COPD: 0

## 2020-11-06 ASSESSMENT — ENCOUNTER SYMPTOMS: SEIZURES: 0

## 2020-11-06 NOTE — ANESTHESIA PREPROCEDURE EVALUATION
"Anesthesia Pre-Procedure Evaluation    Patient: Tanisha Valenzuela   MRN:     [id number] Gender:   female   Age:    35 year old :      1985        Preoperative Diagnosis: * No pre-op diagnosis entered *   Procedure(s):   SECTION     LABS:  CBC:   Lab Results   Component Value Date    WBC 10.6 2020    HGB 11.5 (L) 2020    HGB 12.5 2020    HCT 38.1 2020     2020     2020     BMP:   Lab Results   Component Value Date     2020    POTASSIUM 3.3 (L) 2020    CHLORIDE 108 2020    CO2 20 2020    BUN 5 (L) 2020    CR 0.51 (L) 2020     (H) 2020     COAGS: No results found for: PTT, INR, FIBR  POC:   Lab Results   Component Value Date    BGM 88 2020     OTHER:   Lab Results   Component Value Date    A1C 6.2 (H) 2020    VALERIE 9.2 2020    ALBUMIN 3.2 (L) 2020    PROTTOTAL 7.6 2020    ALT 27 2020    AST 15 2020    ALKPHOS 44 2020    BILITOTAL 0.3 2020        Preop Vitals    BP Readings from Last 3 Encounters:   20 117/56   10/29/20 114/70   10/22/20 116/71    Pulse Readings from Last 3 Encounters:   10/29/20 74   10/22/20 77   10/12/20 73      Resp Readings from Last 3 Encounters:   20 16   20 20    SpO2 Readings from Last 3 Encounters:   No data found for SpO2      Temp Readings from Last 1 Encounters:   20 36.9  C (98.4  F) (Oral)    Ht Readings from Last 1 Encounters:   10/29/20 1.499 m (4' 11\")      Wt Readings from Last 1 Encounters:   10/29/20 86.6 kg (191 lb)    Estimated body mass index is 38.58 kg/m  as calculated from the following:    Height as of 10/29/20: 1.499 m (4' 11\").    Weight as of 10/29/20: 86.6 kg (191 lb).     LDA:  Peripheral IV 20 Anterior;Left Wrist (Active)   Number of days: 0        Past Medical History:   Diagnosis Date     BMI 37.0-37.9, adult      Hyperlipidemia      PCOS (polycystic ovarian " syndrome)      Type 2 diabetes mellitus without complication, with long-term current use of insulin (H)       Past Surgical History:   Procedure Laterality Date      SECTION       CHOLECYSTECTOMY        No Known Allergies     Anesthesia Evaluation     .             ROS/MED HX    ENT/Pulmonary:  - neg pulmonary ROS    (-) asthma and COPD   Neurologic:     (+)migraines,    (-) seizures and CVA   Cardiovascular:     (+) Dyslipidemia, ----. : . . . :. .       METS/Exercise Tolerance:     Hematologic: Comments: Plt 225, Hgb 11.5 - neg hematologic  ROS       Musculoskeletal:  - neg musculoskeletal ROS       GI/Hepatic:     (+) liver disease (non alcoholic fatty liver disease),       Renal/Genitourinary: Comment: Hx of PCOS        Endo:     (+) type II DM Obesity, .      Psychiatric:  - neg psychiatric ROS       Infectious Disease:         Malignancy:      - no malignancy   Other:    (+) no H/O Chronic Pain,                       PHYSICAL EXAM:   Mental Status/Neuro: A/A/O   Airway: Facies: Thick Neck  Mallampati: II  Mouth/Opening: Full  TM distance: > 6 cm  Neck ROM: Full   Respiratory: Auscultation: CTAB     Resp. Rate: Normal     Resp. Effort: Normal      CV: Rhythm: Regular  Rate: Age appropriate  Heart: Normal Sounds  Edema: None   Comments:      Dental: Normal Dentition                Assessment:   ASA SCORE: 3            Plan:   Anes. Type:  MAC; Spinal; Peripheral Nerve Block; For Post-op pain in coordination with surgeon     Block Details: TAP; Exparel; Single Shot   Pre-Medication: None   Induction:  N/a   Airway: Native Airway   Access/Monitoring: PIV   Maintenance: N/a     Postop Plan:   Postop Pain: Regional  Postop Sedation/Airway: Not planned  Disposition: Inpatient/Admit     PONV Management: Adult Risk Factors: Female       Comments for Plan/Consent:  35 year old  at 39w0d initially presenting for TOLAC, now seeking repeat C/S. Pregnancy complicated by T2DM, hx of prior C/S.                  Jovan Dyer MD

## 2020-11-06 NOTE — PLAN OF CARE
Data: Patient presented to [facility] at 1000.   Reason for maternal/fetal assessment per patient is induction of labor.  Patient is a . Prenatal record reviewed.      OB History    Para Term  AB Living   2 1 1 0 0 1   SAB TAB Ectopic Multiple Live Births   0 0 0 0 1      # Outcome Date GA Lbr Abdifatah/2nd Weight Sex Delivery Anes PTL Lv   2 Current            1 Term 04 41w0d  4.536 kg (10 lb) F CS-Classical EPI N CELESTE      Complications: Failure to Progress in Second Stage      Name: Tanisha Romano      Obstetric Comments   Patient reports that she is certain uterine incision is classical.  Denies DM with first pregnancy.  No HTN, needed blood transfusion after surgery.   . Medical history:   Past Medical History:   Diagnosis Date     BMI 37.0-37.9, adult      Hyperlipidemia      PCOS (polycystic ovarian syndrome)      Type 2 diabetes mellitus without complication, with long-term current use of insulin (H)    . Gestational Age 39w0d. VSS. Fetal movement present. Patient denies cramping, backache, vaginal discharge, pelvic pressure, UTI symptoms, GI problems, bloody show, vaginal bleeding, edema, headache, visual disturbances, epigastric or URQ pain, abdominal pain, rupture of membranes. Support persons SAMI Fermin present.  Action: Verbal consent for EFM. Triage assessment completed. EFM applied for fetal assessment. Uterine assessment via TOCO. Fetal assessment: Presumed adequate fetal oxygenation documented (see flow record).   Response: Dr. Jonnie MD informed of arrival and status. Plan per provider is assessment for possibility of TOLAC. Patient verbalized agreement with plan. Patient transferred to room 479 ambulatory, oriented to room and call light.

## 2020-11-06 NOTE — H&P
OBGYN History and Physical  [facility]    HPI:  Tanisha HE Corona Valenzuela is a 35 year old  at 39w0d here for induction of labor. She is feeling well, no contractions, no HA, no vision changes, no SOB, no CP, normal FM, no VB, no LOF.     Pregnancy Complications:  T2DM   H/o1 prior CD  Obesity    PAST MEDICAL HISTORY    Past Medical History:   Diagnosis Date     BMI 37.0-37.9, adult      Hyperlipidemia      PCOS (polycystic ovarian syndrome)      Type 2 diabetes mellitus without complication, with long-term current use of insulin (H)        Past Surgical History:   Procedure Laterality Date      SECTION       CHOLECYSTECTOMY         OB History    Para Term  AB Living   2 1 1 0 0 1   SAB TAB Ectopic Multiple Live Births   0 0 0 0 1      # Outcome Date GA Lbr Abdifatah/2nd Weight Sex Delivery Anes PTL Lv   2 Current            1 Term 04 41w0d  4.536 kg (10 lb) F CS-Classical EPI N CELESTE      Complications: Failure to Progress in Second Stage      Name: Tanisha Romano      Obstetric Comments   Patient reports that she is certain uterine incision is classical.  Denies DM with first pregnancy.  No HTN, needed blood transfusion after surgery.       Family History   Problem Relation Age of Onset     Diabetes Mother      Diabetes Maternal Grandmother        Social History     Tobacco Use     Smoking status: Former Smoker     Smokeless tobacco: Never Used   Substance Use Topics     Alcohol use: Not Currently     Drug use: Never       No Known Allergies    Medications Prior to Admission   Medication Sig Dispense Refill Last Dose     alcohol swab prep pads Use to swab area of injection/jose as directed. 100 each 3      aspirin 81 MG EC tablet Take 81 mg by mouth daily        blood glucose (NO BRAND SPECIFIED) lancets standard Use to test blood sugar 4 times daily or as directed. 200 each 3      blood glucose (NO BRAND SPECIFIED) test strip Use to test blood sugar 4 times daily or as  directed. 200 strip 3      blood glucose (NO BRAND SPECIFIED) test strip Use to test blood sugar 4 times daily or as directed. 200 strip 3      blood glucose monitoring (NO BRAND SPECIFIED) meter device kit Use to test blood sugar 4 times daily or as directed. 1 kit 0      insulin aspart (NOVOLOG FLEXPEN) 100 UNIT/ML pen 6 units before breakfast, 9 units before lunch, 11 units before dinner 15 mL 3      insulin detemir (LEVEMIR PEN) 100 UNIT/ML pen Take 16 units daily at bedtime 15 mL 3      insulin pen needle (ULTICARE MICRO) 32G X 4 MM miscellaneous Use 4  pen needles daily or as directed. 100 each 3      metFORMIN (GLUCOPHAGE) 1000 MG tablet Take 1 tablet (1,000 mg) by mouth 2 times daily (with meals) 180 tablet 3      Prenatal Vit-Fe Fumarate-FA (PRENATAL VITAMIN) 27-0.8 MG TABS Take 1 tablet by mouth daily 90 tablet 3      vitamin D3 (CHOLECALCIFEROL) 2000 units (50 mcg) tablet Take 2 tablets (4,000 Units) by mouth daily Take two tablets daily. 180 tablet 3        ROS:   A complete 10-point ROS negative except as noted in HPI. Tanisha Valenzuela denies headache, blurry vision, chest pain, shortness of breath, RUQ pain, nausea, vomiting, dysuria, hematuria or extremity edema.    PHYSICAL EXAM  /56   Temp 98.4  F (36.9  C) (Oral)   Resp 16   LMP 02/01/2020   Gen: NAD  CV: RRR  Lungs: CTABL  Abdomen: soft, gravid, NT, well-healed VML  Extremities: no BLE edema  Leopolds difficult 2/2 habitus, but EFW 8lb  BSUS: cephalic presentation  SVE: fingertip/50%/-5, soft, posterior - Eng placed by Dr. Cristina with 70 ml in balloon   FHT: 140/moderate variability/+accels/no decels  [place]: irregular, q4-7m, lasting <60s    Prenatal Labs:   Lab Results   Component Value Date    ABO A 03/30/2020    RH Pos 03/30/2020    AS Neg 03/30/2020    HEPBANG Nonreactive 03/30/2020    CHPCRT Negative 09/18/2020    GCPCRT Negative 09/18/2020    HGB 12.5 03/30/2020     Lab Results   Component Value Date    GBS Negative  10/12/2020       ASSESSMENT/PLAN:  Tanisha Valenzuela is a 35 year old  at 39w0d here for induction of labor, TOLAC 2/2 T2DM.    1. Induction:   -will attempt lugo balloon for ripening; otherwise will use oxytocin if attempt unsuccessful.  -prior CD has unknown scar, but likely low transverse given it was at term.  -discussed with Tanisha that she does not have a good chance of success, as her cervix requires ripening, and her prior  was for arrest at 1cm; discussed that patients who require  after attempted TOLAC have an increased risk of complications such as hemorrhage, infection, maternal and/or fetal injury at the time of birth compared to patients who undergo scheduled ; Tanisha wishes to continue to attempt TOLAC.     2. Fetal Well-Being:   -category 1    3. Pain:  -epidural, IV meds, NO2 at Tanisha's request    4. T2DM:  -PTA meds: metformin 1000mg BID, levemir 16U daily, novolog 11  -EFW 3584g (78%ile) with AC 96%ile - elevated risk of shoulder dystocia. Will continue to discuss this with her as labor progresses.  -Will check fasting and 2h PP BG in latent labor; will check hourly with insulin GGT PRN in active labor    5. H/o COVID in July:  -asymptomatic    6. Routine cares:   -Rh POS/RI/GBS neg/Tdap and fluvax UTD    Mary Espinal MD, MSCI    [facility]/OB[facility]N

## 2020-11-06 NOTE — PROGRESS NOTES
To room to examine patient as she was expressing intense pain. Lugo in place with 70 ml, firmly in cervix. Patient expressed constant, intense pain in her lower abdomen and back. States she tried to urinate but could not, straight cath performed with <100 ml return. Pain not improved by bladder drainage. FHT not contiguous but showed baseline 145 bpm, mod variability, no decels. Discussed with patient that I would not expect her to have sudden onset of constant, intense pain after Lugo has been in for many hours. Discussed that while her vitals and baby's heart rate look reassuring, I am worried she may be rupturing her prior  scar. I then removed her lugo balloon and patient expressed immediate improvement of her pain. Again, FHT reassuring. Discussed with patient that because her pain was easily resolved by deflating the balloon and fetal heart rate is normal, she could continue with induction if desired. Again, expressed to her risks of continuing with induction and having a possible emergency  section in the setting of h/o prior , obesity, T2DM. Patient thought over her options and would like to proceed with  delivery. Discussed risks including bleeding, need for transfusion, infection, injury to surrounding structures, need for additional procedures. Written consent obtained. Patient would prefer Pfannenstiel incision rather than repeat via VML. Last ate at 1330, as pain resolved and FHT reassuring, will allow time for NPO status.     Plan discussed with Dr. Espinal.     July Cristina MD  Obstetrics and Gynecology, PGY-3  2020 3:32 PM

## 2020-11-06 NOTE — PLAN OF CARE
Provider JOVANI brooks called to room to assess pt report of severe abdominal pain and back pain.  Patient writhing in bed, sweating and vocalizing.  Eng balloon in place, removed by provider. St cathed for small amount of concentrated urine.

## 2020-11-07 LAB
GLUCOSE BLDC GLUCOMTR-MCNC: 73 MG/DL (ref 70–99)
GLUCOSE BLDC GLUCOMTR-MCNC: 77 MG/DL (ref 70–99)
GLUCOSE BLDC GLUCOMTR-MCNC: 88 MG/DL (ref 70–99)
GLUCOSE BLDC GLUCOMTR-MCNC: 93 MG/DL (ref 70–99)
GLUCOSE BLDC GLUCOMTR-MCNC: 99 MG/DL (ref 70–99)
GLUCOSE SERPL-MCNC: 82 MG/DL (ref 70–99)
HGB BLD-MCNC: 9.6 G/DL (ref 11.7–15.7)

## 2020-11-07 PROCEDURE — 250N000013 HC RX MED GY IP 250 OP 250 PS 637: Performed by: STUDENT IN AN ORGANIZED HEALTH CARE EDUCATION/TRAINING PROGRAM

## 2020-11-07 PROCEDURE — 85018 HEMOGLOBIN: CPT | Performed by: STUDENT IN AN ORGANIZED HEALTH CARE EDUCATION/TRAINING PROGRAM

## 2020-11-07 PROCEDURE — 258N000003 HC RX IP 258 OP 636: Performed by: STUDENT IN AN ORGANIZED HEALTH CARE EDUCATION/TRAINING PROGRAM

## 2020-11-07 PROCEDURE — 999N001017 HC STATISTIC GLUCOSE BY METER IP

## 2020-11-07 PROCEDURE — 250N000011 HC RX IP 250 OP 636: Performed by: STUDENT IN AN ORGANIZED HEALTH CARE EDUCATION/TRAINING PROGRAM

## 2020-11-07 PROCEDURE — 120N000002 HC R&B MED SURG/OB UMMC

## 2020-11-07 PROCEDURE — 36415 COLL VENOUS BLD VENIPUNCTURE: CPT | Performed by: STUDENT IN AN ORGANIZED HEALTH CARE EDUCATION/TRAINING PROGRAM

## 2020-11-07 RX ORDER — NICOTINE POLACRILEX 4 MG
15-30 LOZENGE BUCCAL
Status: DISCONTINUED | OUTPATIENT
Start: 2020-11-07 | End: 2020-11-09 | Stop reason: HOSPADM

## 2020-11-07 RX ORDER — METFORMIN HYDROCHLORIDE EXTENDED-RELEASE TABLETS 1000 MG/1
1000 TABLET, FILM COATED, EXTENDED RELEASE ORAL
Qty: 30 TABLET | Refills: 2 | Status: SHIPPED | OUTPATIENT
Start: 2020-11-07 | End: 2020-11-08

## 2020-11-07 RX ORDER — METFORMIN HCL 500 MG
1000 TABLET, EXTENDED RELEASE 24 HR ORAL
Status: DISCONTINUED | OUTPATIENT
Start: 2020-11-07 | End: 2020-11-09

## 2020-11-07 RX ORDER — DEXTROSE MONOHYDRATE 25 G/50ML
25-50 INJECTION, SOLUTION INTRAVENOUS
Status: DISCONTINUED | OUTPATIENT
Start: 2020-11-07 | End: 2020-11-09 | Stop reason: HOSPADM

## 2020-11-07 RX ORDER — SODIUM CHLORIDE, SODIUM LACTATE, POTASSIUM CHLORIDE, CALCIUM CHLORIDE 600; 310; 30; 20 MG/100ML; MG/100ML; MG/100ML; MG/100ML
INJECTION, SOLUTION INTRAVENOUS CONTINUOUS
Status: DISCONTINUED | OUTPATIENT
Start: 2020-11-07 | End: 2020-11-09 | Stop reason: HOSPADM

## 2020-11-07 RX ADMIN — ACETAMINOPHEN 975 MG: 325 TABLET, FILM COATED ORAL at 06:06

## 2020-11-07 RX ADMIN — ACETAMINOPHEN 975 MG: 325 TABLET, FILM COATED ORAL at 18:39

## 2020-11-07 RX ADMIN — KETOROLAC TROMETHAMINE 30 MG: 30 INJECTION, SOLUTION INTRAMUSCULAR at 16:00

## 2020-11-07 RX ADMIN — ACETAMINOPHEN 975 MG: 325 TABLET, FILM COATED ORAL at 23:14

## 2020-11-07 RX ADMIN — KETOROLAC TROMETHAMINE 30 MG: 30 INJECTION, SOLUTION INTRAMUSCULAR at 10:00

## 2020-11-07 RX ADMIN — ACETAMINOPHEN 975 MG: 325 TABLET, FILM COATED ORAL at 00:11

## 2020-11-07 RX ADMIN — ACETAMINOPHEN 975 MG: 325 TABLET, FILM COATED ORAL at 12:04

## 2020-11-07 RX ADMIN — ENOXAPARIN SODIUM 40 MG: 40 INJECTION SUBCUTANEOUS at 16:00

## 2020-11-07 RX ADMIN — SODIUM CHLORIDE, POTASSIUM CHLORIDE, SODIUM LACTATE AND CALCIUM CHLORIDE 500 ML: 600; 310; 30; 20 INJECTION, SOLUTION INTRAVENOUS at 05:01

## 2020-11-07 RX ADMIN — DOCUSATE SODIUM AND SENNOSIDES 2 TABLET: 8.6; 5 TABLET ORAL at 00:11

## 2020-11-07 RX ADMIN — SODIUM CHLORIDE, POTASSIUM CHLORIDE, SODIUM LACTATE AND CALCIUM CHLORIDE: 600; 310; 30; 20 INJECTION, SOLUTION INTRAVENOUS at 06:03

## 2020-11-07 RX ADMIN — KETOROLAC TROMETHAMINE 30 MG: 30 INJECTION, SOLUTION INTRAMUSCULAR at 23:14

## 2020-11-07 RX ADMIN — DOCUSATE SODIUM AND SENNOSIDES 2 TABLET: 8.6; 5 TABLET ORAL at 23:14

## 2020-11-07 RX ADMIN — KETOROLAC TROMETHAMINE 30 MG: 30 INJECTION, SOLUTION INTRAMUSCULAR at 03:07

## 2020-11-07 RX ADMIN — METFORMIN HYDROCHLORIDE 1000 MG: 500 TABLET, EXTENDED RELEASE ORAL at 17:30

## 2020-11-07 RX ADMIN — METFORMIN HYDROCHLORIDE 1000 MG: 500 TABLET ORAL at 09:36

## 2020-11-07 RX ADMIN — DOCUSATE SODIUM AND SENNOSIDES 1 TABLET: 8.6; 5 TABLET ORAL at 09:36

## 2020-11-07 NOTE — PROGRESS NOTES
Patient arrived to [facility] unit via zoom cart at 2310,with belongings, accompanied by spouse/ significant other, with infant in arms. Received report from SERENITY Walker and checked bands. Unit and room orientation started. Call light within arms reach; no concerns present at this time. Continue with plan of care.

## 2020-11-07 NOTE — PROGRESS NOTES
Brief Progress Note    Curbside consult with endocrinology regarding patient's insulin regimen. Blood sugars have continued to be well controlled after delivery.    Endocrine Recommendations:  - Metformin 1000 mg XL daily   - Discontinue long and short acting insulin at this time  - Medium sliding scale insulin while inpatient  - Follow up with PCP or endocrinology after discharge      Chanelle Reza MD  OBGYN PGY-1  3:17 PM November 7, 2020

## 2020-11-07 NOTE — ANESTHESIA POSTPROCEDURE EVALUATION
Anesthesia POST Procedure Evaluation    Patient: Tanisha Valenzuela   MRN:     [id number] Gender:   female   Age:    35 year old :      1985        Preoperative Diagnosis: * No pre-op diagnosis entered *   Procedure(s):   SECTION   Postop Comments: No value filed.     Anesthesia Type: MAC, Spinal, Peripheral Nerve Block, For Post-op pain in coordination with surgeon       Disposition: Admission   Postop Pain Control: Uneventful            Sign Out: Well controlled pain   PONV: No   Neuro/Psych: Uneventful            Sign Out: Acceptable/Baseline neuro status   Airway/Respiratory: Uneventful            Sign Out: AIRWAY IN SITU/Resp. Support   CV/Hemodynamics: Uneventful            Sign Out: Acceptable CV status   Other NRE: NONE   DID A NON-ROUTINE EVENT OCCUR? No         Last Anesthesia Record Vitals:  CRNA VITALS  2020 - 2020             Pulse:  76    Ht Rate:  75    SpO2:  96 %          Last PACU Vitals:  Vitals Value Taken Time   BP     Temp     Pulse     Resp     SpO2     Temp src     NIBP     Pulse 76 20   SpO2 96 % 20   Resp     Temp     Ht Rate 75 20   Temp 2           Electronically Signed By: Yovanny Bui MD, 2020, 9:30 PM

## 2020-11-07 NOTE — PROGRESS NOTES
Postpartum Progress Note  Tanisha Valenzuela  [id number]    Subjective:  Very early in postpartum course but doing well. Has not been up and out of bed yet. Lugo in place, no complaints with this. Has had some juice and water but no food yet, denies any nausea. Working on breastfeeding baby. Passing gas, no BM yet. Pain well controlled on oral medications. Bleeding is minimal. No headache, vision changes, CP, SOB, RUQ pain.    Objective:  BP 95/61   Pulse 65   Temp 98.1  F (36.7  C) (Oral)   Resp 16   LMP 2020   SpO2 97%   Breastfeeding Unknown     General: NAD, comfortable, resting in bed.  Heart: Regular rate, extremities warm and well-perfused  Lungs: Breathing comfortably, on room air  Abdomen: Soft, non-tender, non-distended; fundus firm and at umbilicus  Extremities: 1+ edema in BLE    Labs:  Blood sugars: 70s-80s     Assessment/Plan: 35 year old  who is POD#1 s/p repeat LTCS. Pregnancy was notable for T2DM and obesity. Currently stable and doing well but early in postpartum course with lugo still in place.     # T2DM  - PTA: levemir 16, novolog 11, metformin 1000 bid  - Current: levemir 8, novolog , metformin 1000 bid  - Blood sugars 70s-80s   - Endo consult requested      # Postpartum  - Continue routine post-partum cares     - Heme: 11.5 ---> 1294 ml QBL--> 9.6; dscharge home with PO iron   - Pain: Continue scheduled tylenol, ibuprofen, oxycodone prn   - GI: PRN senna, miralax daily, simethicone, anti-emetics.  - : lugo in place, discontinue today   - Baby:  Stable, breastfeeding  - Contraception: Will discuss   - Rh pos, Rubella immune, GBS neg  - s/p flu and Tdap   - PPx: Encourage ambulation    Dispo: Discharge to home when meeting postpartum goals      Chanelle Reza MD  OBGYN PGY-1  7:41 AM 2020      I have seen and examined the patient without the resident. I have reviewed, edited, and agree with the note.   My findings are:My findings are: Appears  well.   Abdomen: soft, NT, ND.   Incision bandage Clean and Dry  LE without significant edema or erythema bilaterally  Hemoglobin   Date Value Ref Range Status   11/07/2020 9.6 (L) 11.7 - 15.7 g/dL Final   11/06/2020 11.5 (L) 11.7 - 15.7 g/dL Final       Della Morataya MD

## 2020-11-07 NOTE — PLAN OF CARE
Data: Vital signs within normal limits. Postpartum checks within normal limits - see flow record. Patient eating and drinking normally. Patient able to empty bladder independently and is up ambulating. No apparent signs of infection. Incision healing well, covered with dressing.  Small amount drainage on dressing but appears to be from perineal lochia not incisional drainage.  Patient performing self cares and is able to care for infant.  BGs 80-90s, insulin and PO insulin medications adjusted per Endocrine.  Continue AC/HS BG checks.  Action: Patient medicated during the shift for pain and cramping. See MAR. Patient reassessed within 1 hour after each medication and pain was improved - patient stated she was comfortable. Patient education done about Eng removal, normal lochia, breastfeeding support,  cares and normal behaviors. See flow record.  Response: Positive attachment behaviors observed with infant. Support persons  present.   Plan: Anticipate discharge on tomorrow vs Monday.

## 2020-11-07 NOTE — PLAN OF CARE
Data: Vital signs within normal limits. Postpartum checks within normal limits - see flow record. Patient drinking normally, fluids encouraged. Has not eaten yet. Discussed regular diet. Glucose checked at 0200, result 73. Apple juice given to patient. Eng catheter intact. UOP minimal at 0500. MD notified, 500ml LR bolus ordered and given. Maintenance IVF running at 125 ml/hr.  No apparent signs of infection. Incision covered with dressing, clean/dry/intact. Requires assistance with infant cares.  Action: Patient medicated during the shift for pain with Toradol and Tylenol. See MAR. Patient reassessed within 1 hour after each medication and pain was improved - patient stated she was comfortable. Patient education done about frequency of assessments and fundal checks. Discussed pain management with scheduled Toradol and Tylenol and Oxycodone as needed. Room orientation started. See flow record.  Response: Positive attachment behaviors observed with infant. , Zander, present and attentive.  Plan: Anticipate discharge on 11/9/20. Continue POC.

## 2020-11-07 NOTE — PROVIDER NOTIFICATION
11/07/20 0454   Provider Notification   Provider Name/Title MD Cristina   Method of Notification Electronic Page   Request Evaluate-Remote   Notification Reason Status Update   7125 LM.L pt urine output is low 20 cc/1 hr average since transfer from PACU pit just completed; can you please place order for LR pt is DM2 no d5. would you like bolus? thank you Araseli [id number] SERENITY

## 2020-11-07 NOTE — ANESTHESIA PROCEDURE NOTES
Peripheral Nerve Block Procedure Note      Staff -   Anesthesiologist:  Yovanny Bui MD  Resident/Fellow: Rodney Kwong MD  Performed By: anesthesiologist and with residents  Procedure performed by resident/CRNA in presence of a teaching physician.    Location: OR AFTER induction  Procedure Start/Stop TImes:      11/6/2020 9:15 PM     11/6/2020 9:25 PM    patient identified, IV checked, site marked, risks and benefits discussed, informed consent, monitors and equipment checked, pre-op evaluation, at physician/surgeon's request and post-op pain management      Correct Patient: Yes      Correct Position: Yes      Correct Site: Yes      Correct Procedure: Yes      Correct Laterality:  Yes    Site Marked:  Yes  Procedure details:     Procedure:  TAP    Diagnosis:  Post operative pain    Laterality:  Bilateral    Position:  Supine    Sterile Prep: chloraprep, mask and sterile gloves      Local skin infiltration:  None    Needle:  Short bevel    Needle gauge:  21    Needle length (mm):  110    Ultrasound: Yes      Ultrasound used to identify targeted nerve, plexus, or vascular structure and placed a needle adjacent to it      Permanent Image entered into patiient's record      Abnormal pain on injection: No      Blood Aspirated: No      Paresthesias:  No    Bleeding at site: No      Bolus via:  Needle    Infusion Method:  Single Shot    Complications:  None  Assessment/Narrative:     Injection made incrementally with aspirations every (mL):  5

## 2020-11-07 NOTE — OP NOTE
[facility] [place]  Full Operative Progress Note     Surgery Date:  2020    Surgeon:  Radha Trinidad MD    Assistants:  July Cristina MD, PGY-3    Pre-op Diagnosis:    - Intrauterine pregnancy at 39w0d  - History of prior  x1  - T2DM   - Obesity      Post-op Diagnosis:    - Same   - Liveborn female infant     Procedure:  Repeat low-transverse  section with two layer uterine closure via Pfannestiel incision    Anesthesia: Spinal     EBL:  1294 mL    IVF:  800 mL crystalloid    UOP:  See anesthesia record     Drains: Eng Catheter     Specimens:  Cord blood     Complications: None apparent    Indications:   Tanisha Valenzuela is a 35 year old  at 39w0d admitted for IOL for T2DM. Patient underwent cervical ripening with a Eng catheter, however had severe lower abdominal pain and back pain with the catheter which resolved after removal. The patient then decided to proceed with repeat  section.  The risks, benefits, and alternatives of  section were discussed with the patient, and she agreed to proceed.     Findings:   1. No adhesions   2. Clear amniotic fluid  3. Liveborn female infant in cephalic presentation. Apgars 8 at 1 minute & 9 at 5 minutes. Weight pending.  4. Normal uterus, fallopian tubes, and ovaries.     Procedure Details:   The patient was brought to the OR, where adequate spinal anesthesia was administered.  She was placed in the dorsal supine position with a slight leftward tilt. She was prepped and draped in the usual sterile fashion. A surgical time out was performed. A pfannenstiel skin incision was made with the scalpel, and carried down to the underlying fascia with sharp and blunt dissection. The fascia was incised in the midline, and the incision was extended laterally with the Mercado scissors. The superior aspect of the fascia was grasped with the Kocher clamps and dissected off of the underlying rectus muscles with blunt  and sharp dissection. Attention was then turned to the inferior aspect of the fascia, which was similarly dissected off of the underlying rectus muscles. The rectus muscles were  in the midline, and the peritoneum was entered bluntly, and the opening was extended with digital pressure. The bladder blade was placed. A transverse hysterotomy was made with the scalpel in the lower uterine segment, and the incision was extended with digital pressure. The infant was noted to be in the cephalic position, and was delivered atraumatically. The shoulders delivered easily. Nuchal cord x1 was noted. The cord was doubly clamped and cut, and the infant was handed off to the awaiting nursery staff. A segment of cord was cut and held. The placenta was delivered with gentle traction on the umbilical cord and uterine massage. The uterus was exteriorized and cleared of all clots and debris. Uterine tone was noted to be boggy with high dose pitocin given through the running IV and uterine massage, therefore a dose of IM methergine was given.  The hysterotomy was closed with a running locked suture of 0 Vicryl.  The hysterotomy was then imbricated using an 0 Monocryl suture. The hysterotomy was noted to be hemostatic. The posterior cul-de-sac cleared of all clots and debris. The uterus was returned to the abdomen. The pericolic gutters were cleared of all clots and debris. The hysterotomy was reexamined and noted to be hemostatic. The fascia and rectus muscles were examined and areas of oozing were controlled with electrocautery. The fascia was closed with a running 0 Vicryl suture. The subcutaneous tissue was irrigated and areas of oozing were controlled with electrocautery. The subcutaneous tissue was greater than 2 cm in thickness, and was therefore closed with a running suture of 3-0 Vicryl. The skin was closed with 4-0 Monocryl and covered with a sterile dressing.    All sponge, needle, and instrument counts were correct.  The patient tolerated the procedure well, and was transferred to recovery in stable condition. Dr. Trinidad was present and scrubbed for the entirety of the procedure.     July Cristina MD  OB/GYN Resident, PGY-3  11/6/2020 7:55 PM    Radha Trinidad MD

## 2020-11-07 NOTE — PLAN OF CARE
Patient delivered via repeat c section at 2040. See anesthesia record for medication administration. Patient transferred to L&D PACU at 2127. Pt stable in PACU, transferred to [facility] at 2300

## 2020-11-07 NOTE — PLAN OF CARE
Lugo balloon inserted this afternoon removed due to patient reported extreme pain in lower uterine segment and concern for uterine scar rupture.  Patient now comfortable with lugo removed, choosing to proceed with repeat c section. D5 LR infusing, patient NPO. VS WNL.  Will continue to monitor.

## 2020-11-07 NOTE — DISCHARGE SUMMARY
[facility] Discharge Summary    Tanisha Valenzuela MRN# [id number]   Age: 35 year old YOB: 1985     Date of Admission:  2020  Date of Discharge::  20  Admitting Physician:  Mary Espinal MD  Discharge Physician:  Mary Espinal MD             Admission Diagnoses:   Intrauterine pregnancy at 39w0d  History of prior  section x1  T2DM  Obesity           Discharge Diagnosis:     Same, delivered           Procedures:     Procedure(s): Repeat low transverse  section with two layer closure via Pfannenstiel skin incision  Spinal anesthesia   TAP block                Medications Prior to Admission:     Medications Prior to Admission   Medication Sig Dispense Refill Last Dose     alcohol swab prep pads Use to swab area of injection/jose as directed. 100 each 3      aspirin 81 MG EC tablet Take 81 mg by mouth daily        blood glucose (NO BRAND SPECIFIED) lancets standard Use to test blood sugar 4 times daily or as directed. 200 each 3      blood glucose (NO BRAND SPECIFIED) test strip Use to test blood sugar 4 times daily or as directed. 200 strip 3      blood glucose (NO BRAND SPECIFIED) test strip Use to test blood sugar 4 times daily or as directed. 200 strip 3      blood glucose monitoring (NO BRAND SPECIFIED) meter device kit Use to test blood sugar 4 times daily or as directed. 1 kit 0      insulin pen needle (ULTICARE MICRO) 32G X 4 MM miscellaneous Use 4  pen needles daily or as directed. 100 each 3      Prenatal Vit-Fe Fumarate-FA (PRENATAL VITAMIN) 27-0.8 MG TABS Take 1 tablet by mouth daily 90 tablet 3      vitamin D3 (CHOLECALCIFEROL) 2000 units (50 mcg) tablet Take 2 tablets (4,000 Units) by mouth daily Take two tablets daily. 180 tablet 3      [DISCONTINUED] insulin aspart (NOVOLOG FLEXPEN) 100 UNIT/ML pen 6 units before breakfast, 9 units before lunch, 11 units before dinner 15 mL 3 2020 at Unknown time     [DISCONTINUED] insulin detemir (LEVEMIR  PEN) 100 UNIT/ML pen Take 16 units daily at bedtime 15 mL 3      [DISCONTINUED] metFORMIN (GLUCOPHAGE) 1000 MG tablet Take 1 tablet (1,000 mg) by mouth 2 times daily (with meals) 180 tablet 3              Discharge Medications:        Review of your medicines      START taking      Dose / Directions   ferrous sulfate 325 (65 Fe) MG tablet  Commonly known as: FEROSUL  Used for:  delivery delivered      Dose: 325 mg  Take 1 tablet (325 mg) by mouth daily (with breakfast)  Quantity: 90 tablet  Refills: 0     metFORMIN osmotic 1000 MG 24 hr tablet  Commonly known as: FORTAMET  Replaces: metFORMIN 1000 MG tablet      Dose: 1,000 mg  Take 1 tablet (1,000 mg) by mouth daily (with dinner)  Quantity: 30 tablet  Refills: 2     norethindrone 0.35 MG tablet  Commonly known as: MICRONOR  Used for:  delivery delivered      Dose: 0.35 mg  Take 1 tablet (0.35 mg) by mouth daily  Quantity: 90 tablet  Refills: 1        CONTINUE these medicines which have NOT CHANGED      Dose / Directions   alcohol swab prep pads  Used for: Pre-existing type 2 diabetes mellitus during pregnancy in first trimester      Use to swab area of injection/jose as directed.  Quantity: 100 each  Refills: 3     aspirin 81 MG EC tablet      Dose: 81 mg  Take 81 mg by mouth daily  Refills: 0     blood glucose lancets standard  Commonly known as: NO BRAND SPECIFIED      Use to test blood sugar 4 times daily or as directed.  Quantity: 200 each  Refills: 3     blood glucose monitoring meter device kit  Commonly known as: NO BRAND SPECIFIED      Use to test blood sugar 4 times daily or as directed.  Quantity: 1 kit  Refills: 0     * blood glucose test strip  Commonly known as: NO BRAND SPECIFIED  Used for: Pre-existing type 2 diabetes mellitus during pregnancy in first trimester      Use to test blood sugar 4 times daily or as directed.  Quantity: 200 strip  Refills: 3     * blood glucose test strip  Commonly known as: NO BRAND SPECIFIED      Use to  test blood sugar 4 times daily or as directed.  Quantity: 200 strip  Refills: 3     Prenatal Vitamin 27-0.8 MG Tabs  Used for: High-risk pregnancy, unspecified trimester      Dose: 1 tablet  Take 1 tablet by mouth daily  Quantity: 90 tablet  Refills: 3     ULTICARE MICRO 32G X 4 MM miscellaneous  Used for: Pre-existing type 2 diabetes mellitus during pregnancy in first trimester  Generic drug: insulin pen needle      Use 4  pen needles daily or as directed.  Quantity: 100 each  Refills: 3     vitamin D3 50 mcg (2000 units) tablet  Commonly known as: CHOLECALCIFEROL  Used for: Low vitamin D level      Dose: 2 tablet  Take 2 tablets (4,000 Units) by mouth daily Take two tablets daily.  Quantity: 180 tablet  Refills: 3         * This list has 2 medication(s) that are the same as other medications prescribed for you. Read the directions carefully, and ask your doctor or other care provider to review them with you.            STOP taking    insulin detemir 100 UNIT/ML pen  Commonly known as: LEVEMIR PEN        metFORMIN 1000 MG tablet  Commonly known as: GLUCOPHAGE  Replaced by: metFORMIN osmotic 1000 MG 24 hr tablet        NovoLOG FLEXPEN 100 UNIT/ML pen  Generic drug: insulin aspart              Where to get your medicines      These medications were sent to [facility] [address]  [address] [address]    Phone: [phone]     ferrous sulfate 325 (65 Fe) MG tablet    metFORMIN osmotic 1000 MG 24 hr tablet    norethindrone 0.35 MG tablet               Consultations:   Anesthesia          Brief Admission History:   Tanisha Valenzuela is a 35 year old  at 39w0d admitted for IOL for T2DM. Patient underwent cervical ripening with a Eng catheter, however had severe lower abdominal pain and back pain with the catheter which resolved after removal. The patient then decided to proceed with repeat  section.  The risks, benefits, and  alternatives of  section were discussed with the patient, and she agreed to proceed.        Intraoperative course   The procedure was uncomplicated.  EBL 1294 mL.  See operative report for details.     Findings:   1. No adhesions   2. Clear amniotic fluid  3. Liveborn female infant in cephalic presentation. Apgars 8 at 1 minute & 9 at 5 minutes. Weight pending.  4. Normal uterus, fallopian tubes, and ovaries.        Postpartum Course   The patient's hospital course was unremarkable.  She recovered as anticipated and experienced no post-operative complications. Endocrine was consulted who recommended discontinuing medication for management of T2DM. On discharge, her pain was well controlled. Vaginal bleeding is similar to peak menstrual flow.  Voiding without difficulty.  Ambulating well and tolerating a normal diet.  No fever or significant wound drainage.  Breastfeeding well.  Infant is stable.  No bowel movement yet.  She was discharged on post-partum day #3. She desires POPs for discharge.     Post-partum hemoglobin:   Hemoglobin   Date Value Ref Range Status   2020 9.6 (L) 11.7 - 15.7 g/dL Final             Discharge Instructions and Follow-Up:     Discharge diet: Regular   Discharge activity: No lifting greater than 20 lbs, pushing, pulling, or other strenuous activity for 6 weeks. Pelvic rest for 6 weeks including no sexual intercourse, tampons, or douching. No driving until you can slam on the breaks without pain or while on narcotic pain medications.    Discharge follow-up: Follow up with primary OB for routine postpartum visit in 6 weeks. Endocrine or PCP follow up in 1-2 weeks for T2DM management.    Wound care: Keep incision clean and dry           Discharge Disposition:     Discharged to home      July Crsitina MD  Obstetrics and Gynecology, PGY-3  2020 6:13 AM       OBGYN Attending Addendum     Ms. Tanisha Valenzuela was seen and examined by me separately from the team.  I  have reviewed and agree with the above note by Dr. Cristina. Ms. Garay is appropriate for discharge today.    I personally reviewed the following: vitals, meds, labs.    I spent 15 minutes on discharge activities.    Mary Espinal MD, MSCI  Date of Service: 11/9/2020

## 2020-11-07 NOTE — ANESTHESIA PROCEDURE NOTES
Spinal/LP Procedure Note    Spinal Block      Staff -   Anesthesiologist:  Yovanny Bui MD  Resident/Fellow: Rodney Kwong MD  Performed By: anesthesiologist and with residents  Procedure performed by resident/CRNA in presence of a teaching physician.    Location: OB and In OR BEFORE Induction  Procedure Start/Stop Times:      11/6/2020 8:15 PM     11/6/2020 8:20 PM    patient identified, IV checked, site marked, risks and benefits discussed, informed consent, monitors and equipment checked, pre-op evaluation, at physician/surgeon's request and post-op pain management      Correct Patient: Yes      Correct Position: Yes      Correct Site: Yes      Correct Procedure: Yes      Correct Laterality:  Yes    Site Marked:  Yes  Procedure:     Procedure:  Intrathecal    ASA:  3    Position:  Sitting    Sterile Prep: chloraprep      Insertion site:  L3-4    Approach:  Midline    Needle Type:  Pencan    Needle gauge (G):  25    Local Skin Infiltration:  1% lidocaine    Needle Length (in):  3.5    Introducer used: Yes      Introducer gauge:  20 G    Attempts:  1    Redirects:  1    CSF:  Clear    Paresthesias:  No

## 2020-11-07 NOTE — PLAN OF CARE
Data: Tanisha Valenzuela transferred to [facility] via Zoom Cart at 2305. Baby transferred via parent's arms.  Action: Receiving unit notified of transfer: Yes. Patient and family notified of room change. Report given to SERENITY Marx at 2310. Belongings sent to receiving unit. Accompanied by Registered Nurse. Oriented patient to surroundings. Call light within reach. ID bands double-checked with receiving RN.  Response: Patient tolerated transfer and is stable.

## 2020-11-07 NOTE — ANESTHESIA CARE TRANSFER NOTE
Patient: Tanisha Valenzuela    Procedure(s):   SECTION    Diagnosis: * No pre-op diagnosis entered *  Diagnosis Additional Information: No value filed.    Anesthesia Type:   MAC, Spinal, Peripheral Nerve Block, For Post-op pain in coordination with surgeon     Note:  Airway :Room Air  Patient transferred to:PACU  Comments: VSS. Breathing spontaneously at a regular rate with adequate tidal volumes and maintaining O2 sats on RA. Denies nausea or pain. No apparent complications from anesthesia.     Rodney Kwong MD, MSc.  Anesthesia Resident, CA-3  Handoff Report: Identifed the Patient, Identified the Reponsible Provider, Reviewed the pertinent medical history, Discussed the surgical course, Reviewed Intra-OP anesthesia mangement and issues during anesthesia, Set expectations for post-procedure period and Allowed opportunity for questions and acknowledgement of understanding      Vitals: (Last set prior to Anesthesia Care Transfer)    CRNA VITALS  2020 - 20206      2020             Pulse:  76    Ht Rate:  75    SpO2:  96 %                Electronically Signed By: Rodney Kwong MD  2020  9:36 PM

## 2020-11-08 LAB
GLUCOSE BLDC GLUCOMTR-MCNC: 102 MG/DL (ref 70–99)
GLUCOSE BLDC GLUCOMTR-MCNC: 103 MG/DL (ref 70–99)
GLUCOSE BLDC GLUCOMTR-MCNC: 118 MG/DL (ref 70–99)
GLUCOSE BLDC GLUCOMTR-MCNC: 64 MG/DL (ref 70–99)
GLUCOSE BLDC GLUCOMTR-MCNC: 65 MG/DL (ref 70–99)
GLUCOSE BLDC GLUCOMTR-MCNC: 79 MG/DL (ref 70–99)
GLUCOSE BLDC GLUCOMTR-MCNC: 80 MG/DL (ref 70–99)
GLUCOSE BLDC GLUCOMTR-MCNC: 84 MG/DL (ref 70–99)

## 2020-11-08 PROCEDURE — 999N001017 HC STATISTIC GLUCOSE BY METER IP

## 2020-11-08 PROCEDURE — 250N000011 HC RX IP 250 OP 636: Performed by: STUDENT IN AN ORGANIZED HEALTH CARE EDUCATION/TRAINING PROGRAM

## 2020-11-08 PROCEDURE — 120N000002 HC R&B MED SURG/OB UMMC

## 2020-11-08 PROCEDURE — 250N000013 HC RX MED GY IP 250 OP 250 PS 637: Performed by: STUDENT IN AN ORGANIZED HEALTH CARE EDUCATION/TRAINING PROGRAM

## 2020-11-08 RX ORDER — METFORMIN HYDROCHLORIDE EXTENDED-RELEASE TABLETS 1000 MG/1
1000 TABLET, FILM COATED, EXTENDED RELEASE ORAL
Qty: 30 TABLET | Refills: 2 | Status: SHIPPED | OUTPATIENT
Start: 2020-11-08 | End: 2020-11-09

## 2020-11-08 RX ORDER — ACETAMINOPHEN AND CODEINE PHOSPHATE 120; 12 MG/5ML; MG/5ML
0.35 SOLUTION ORAL DAILY
Qty: 90 TABLET | Refills: 1 | Status: SHIPPED | OUTPATIENT
Start: 2020-11-08

## 2020-11-08 RX ORDER — FERROUS SULFATE 325(65) MG
325 TABLET ORAL
Qty: 90 TABLET | Refills: 0 | Status: SHIPPED | OUTPATIENT
Start: 2020-11-08

## 2020-11-08 RX ADMIN — METFORMIN HYDROCHLORIDE 1000 MG: 500 TABLET, EXTENDED RELEASE ORAL at 16:17

## 2020-11-08 RX ADMIN — DOCUSATE SODIUM AND SENNOSIDES 1 TABLET: 8.6; 5 TABLET ORAL at 08:24

## 2020-11-08 RX ADMIN — ENOXAPARIN SODIUM 40 MG: 40 INJECTION SUBCUTANEOUS at 16:17

## 2020-11-08 RX ADMIN — ACETAMINOPHEN 975 MG: 325 TABLET, FILM COATED ORAL at 13:53

## 2020-11-08 RX ADMIN — ACETAMINOPHEN 975 MG: 325 TABLET, FILM COATED ORAL at 19:24

## 2020-11-08 RX ADMIN — ACETAMINOPHEN 975 MG: 325 TABLET, FILM COATED ORAL at 06:48

## 2020-11-08 RX ADMIN — IBUPROFEN 800 MG: 800 TABLET ORAL at 05:35

## 2020-11-08 RX ADMIN — IBUPROFEN 800 MG: 800 TABLET ORAL at 19:31

## 2020-11-08 RX ADMIN — IBUPROFEN 800 MG: 800 TABLET ORAL at 11:43

## 2020-11-08 RX ADMIN — DOCUSATE SODIUM AND SENNOSIDES 2 TABLET: 8.6; 5 TABLET ORAL at 19:24

## 2020-11-08 NOTE — PROVIDER NOTIFICATION
11/08/20 1000   Provider Notification   Provider Name/Title G1   Method of Notification Electronic Page   Request Evaluate-Remote   Notification Reason Lab Results   FYI, patient has been having some low BGs.  When checked at night it was 64.  When we checked before her breakfast it was 65.  Just FYI. Thanks Damaris [phone]

## 2020-11-08 NOTE — PROGRESS NOTES
Post Partum Progress Note  POD#2, s/p RLTCS    Subjective:  She is resting comfortably in bed this morning. Reports pain is tolerable with meds. Tolerating PO without nausea or vomiting. Lochia minimal. Voiding adequately. Passing gas. Ambulating without dizziness or difficulty. Plans to breastfeed.    Objective:  Vitals:    20 1606 20 2000 20 0000 20 0828   BP: 104/63 98/63 104/61 103/61   Pulse: 62 71 64 64   Resp: 18 16 16 18   Temp: 98  F (36.7  C) 98  F (36.7  C) 98  F (36.7  C) 98.1  F (36.7  C)   TempSrc: Oral Oral Oral Oral   SpO2: 96% 97%       General: NAD. A&Ox3.  CV: Well perfused.  Pulm: Normal respiratory effort.  Abd: Soft, non-tender, non-distended. Fundus is firm and below the umbilicus.  Incision with steri strips in place, c/d/i.   Ext: Trace edema. No calf tenderness.    Labs/Imaging:   BG wnl    Assessment/Plan:  Tanisha Valenzuela is a 35 year old  female who is POD#2 s/p RLTCS. Pregnancy complicated by T2DM. Doing well postpartum.    #T2DM  - PTA levemir 16, novolog /, metformin 1000 mg BID  - Per endo curbside: Metformin 1000 mg XL QD, mSSI  - BG wnl, not requiring insulin    #Postpartum cares  - Encourage routine post-operative goals including ambulation and incentive spirometry  - PNC: Rh positive. Rubella immune. No intervention indicated.  - Pain: Controlled on oral medications  - Heme: Hgb 11.5> QBL 1294 (meth)> 9.6. Will discharge home with iron.  - GI: Continue anti-emetics and stool softeners as needed.  - : Low UOP POD#1, resolved. Voiding.  - Infant: Stable in room  - Feeding: Plans on breastfeeding.  - BC: Plans on POPs    Discharge to home either later today vs tomorrow    Glory Krishna MD  OB/GYN Resident, PGY-3  2020 9:32 AM   I saw and evaluated the patient. I agree with the   findings and the plan of care as documented in the resident's note.  MD Suze

## 2020-11-08 NOTE — PLAN OF CARE
Data: Vital signs within normal limits. Postpartum checks within normal limits - see flow record. Patient eating and drinking normally. Blood sugar measurements (see flow sheet - 64, 85, 103)  First blood sugar patient was given 1 apple juice with her breakfast.     Patient able to empty bladder independently and is up ambulating. No apparent signs of infection. Incision healing well, area has some drainage.  Writer applied Interdry dressing.   Patient performing self cares and is able to care for infant.    Action: Patient medicated during the shift for pain. See MAR. Patient reassessed within 1 hour after each medication and pain was improved - patient stated she was comfortable. Patient education done about breastfeeding support, blood sugar management provided.   See flow record.  Response: Positive attachment behaviors observed with infant. Support persons were present and attentive to mom and baby.   Plan: Anticipate discharge on 11/9/2020.

## 2020-11-08 NOTE — PLAN OF CARE
VSS, postpartum assessment WNL, voiding well and passing gas, due to stool.  Pain managed with motrin and tylenol.  Pt showered and removed incisional dressing.  Bedtime blood glucose 77.  0300 blood glucose 64, pt denies s/s of hypoglycemia.  Juice and yogurt given and repeat blood glucose 80.  Pt denies questions or concerns at this time, will continue to monitor.

## 2020-11-09 ENCOUNTER — LACTATION ENCOUNTER (OUTPATIENT)
Age: 35
End: 2020-11-09

## 2020-11-09 ENCOUNTER — APPOINTMENT (OUTPATIENT)
Dept: INTERPRETER SERVICES | Facility: CLINIC | Age: 35
End: 2020-11-09
Payer: COMMERCIAL

## 2020-11-09 VITALS
RESPIRATION RATE: 16 BRPM | HEART RATE: 66 BPM | SYSTOLIC BLOOD PRESSURE: 115 MMHG | OXYGEN SATURATION: 97 % | TEMPERATURE: 98.3 F | DIASTOLIC BLOOD PRESSURE: 65 MMHG

## 2020-11-09 LAB
GLUCOSE BLDC GLUCOMTR-MCNC: 65 MG/DL (ref 70–99)
GLUCOSE BLDC GLUCOMTR-MCNC: 69 MG/DL (ref 70–99)
GLUCOSE BLDC GLUCOMTR-MCNC: 71 MG/DL (ref 70–99)
GLUCOSE BLDC GLUCOMTR-MCNC: 75 MG/DL (ref 70–99)
GLUCOSE BLDC GLUCOMTR-MCNC: 90 MG/DL (ref 70–99)
PLATELET # BLD AUTO: 185 10E9/L (ref 150–450)

## 2020-11-09 PROCEDURE — 85049 AUTOMATED PLATELET COUNT: CPT | Performed by: STUDENT IN AN ORGANIZED HEALTH CARE EDUCATION/TRAINING PROGRAM

## 2020-11-09 PROCEDURE — 999N001017 HC STATISTIC GLUCOSE BY METER IP

## 2020-11-09 PROCEDURE — 36415 COLL VENOUS BLD VENIPUNCTURE: CPT | Performed by: STUDENT IN AN ORGANIZED HEALTH CARE EDUCATION/TRAINING PROGRAM

## 2020-11-09 PROCEDURE — 250N000013 HC RX MED GY IP 250 OP 250 PS 637: Performed by: STUDENT IN AN ORGANIZED HEALTH CARE EDUCATION/TRAINING PROGRAM

## 2020-11-09 PROCEDURE — 99207 PR NO BILLABLE SERVICE THIS VISIT: CPT | Performed by: INTERNAL MEDICINE

## 2020-11-09 RX ADMIN — IBUPROFEN 800 MG: 800 TABLET ORAL at 08:37

## 2020-11-09 RX ADMIN — IBUPROFEN 800 MG: 800 TABLET ORAL at 02:05

## 2020-11-09 RX ADMIN — ACETAMINOPHEN 975 MG: 325 TABLET, FILM COATED ORAL at 08:38

## 2020-11-09 RX ADMIN — DOCUSATE SODIUM AND SENNOSIDES 2 TABLET: 8.6; 5 TABLET ORAL at 08:37

## 2020-11-09 RX ADMIN — IBUPROFEN 800 MG: 800 TABLET ORAL at 14:33

## 2020-11-09 RX ADMIN — ACETAMINOPHEN 975 MG: 325 TABLET, FILM COATED ORAL at 14:34

## 2020-11-09 RX ADMIN — ACETAMINOPHEN 975 MG: 325 TABLET, FILM COATED ORAL at 02:06

## 2020-11-09 NOTE — CONSULTS
Brief Diabetes/ Endocrinology Consult Note      Assessment/Plan:      35 year old postpartum  female with T2DM, who is POD#3 s/p RLTCS. Our service was consulted for management of T2DM given hypoglycemia to 60s    #T2DM   #Postpartum POD#3 s/p RLTCS  #Hypoglycemia  PTA levemir 16, novolog 11, metformin 1000 bid  Inpatiet regimen: Metformin 1000mg BID. Not on any insulins during hospitalization.  Our service was curbsided over weekend. We recommended to discontinue long and short acting insulin as her blood glucoses are well controlled without insulins. We were later consulted due to low BG while on Metformin 1000mg BID.    We expect insulin resistant state to rapidly decrease given postpartum and expect her diabetes control to go back to baseline.     Given tight blood glucoses mostly in the 70s-80s and couple episodes of BG to 60s will discontinue Metformin for now.   Pt can be followed up with PCP and in Endocrinology clinic in 2-4 weeks after discharge    - Discontinue Metformin 1000mg BID. Can be resumed during follow up when blood glucoses are better  - Advised patient to check blood glucose daily  - Signs and symptoms of hypoglycemia informed. Advised patient to check her blood glucose when she becomes symptomatic  - Encourage breastfeeding  - Diet/ lifestyle modification advised  - Follow up with Endocrinology clinic in 2-4 weeks after discharge.     Data:    Charts reviewed  Labs reviewed    Subjective:  Patient had a headache at 2 am when her BG in the 60s. She reports no nausea, tremors, hunger or diaphoresis.   She said she was not on insulin pre-pregnancy, she was only onMetfomin 1000mg BID.   She has not received any insulins since admission. Last dose was on Friday morning on 2020.    Vitals:  Vital signs:  Temp: 98.3  F (36.8  C) Temp src: Oral BP: 115/65 Pulse: 66   Resp: 16            Estimated body mass index is 38.58 kg/m  as calculated from the following:    Height as of 10/29/20:  "1.499 m (4' 11\").    Weight as of 10/29/20: 86.6 kg (191 lb).  Alert and oriented. Stable mood.       Labs:  Results for TRESA BAXTER (MRN [id number]) as of 11/9/2020 14:21   Ref. Range 11/6/2020 10:35 11/6/2020 16:04 11/6/2020 19:02 11/6/2020 22:06 11/6/2020 22:40 11/7/2020 02:11 11/7/2020 09:35 11/7/2020 12:48 11/7/2020 17:35 11/7/2020 23:28 11/8/2020 03:02 11/8/2020 03:31 11/8/2020 09:18 11/8/2020 09:45 11/8/2020 10:05 11/8/2020 12:37 11/8/2020 16:25 11/8/2020 22:11 11/9/2020 02:10 11/9/2020 02:50 11/9/2020 06:49 11/9/2020 07:10 11/9/2020 09:30   Glucose Latest Ref Range: 70 - 99 mg/dL 88 71 81 83 82 73 93 88 99 77 64 (L) 80 65 (L) 84 118 (H) 103 (H) 102 (H) 79 65 (L) 90 69 (L) 75 71       *Note written as brief consult note as patient was discharge prior to rounds.   Plans discussed with Attending    Jennifer Thao  PGY 4 Fellow  Division of Endocrinology          "

## 2020-11-09 NOTE — PLAN OF CARE
Vital signs within normal limits. Postpartum checks within normal limits. Patient eating and drinking normally. BG check wnl, see results. Patient able to empty bladder independently and is up ambulating. No apparent signs of infection.  Breastfeeding on cue. Positive attachment behaviors observed with infant. Support person present. Anticipate discharge on 11/09/20.

## 2020-11-09 NOTE — DISCHARGE INSTRUCTIONS
* Stop Metformin    * Follow up with Endocrine in 2-4 weeks    Postop  Birth Instructions    Activity       Do not lift more than 10 pounds for 6 weeks after surgery.  Ask family and friends for help when you need it.    No driving until you have stopped taking your pain medications (usually two weeks after surgery).    No heavy exercise or activity for 6 weeks.  Don't do anything that will put a strain on your surgery site.    Don't strain when using the toilet.  Your care team may prescribe a stool softener if you have problems with your bowel movements.     To care for your incision:       Keep the incision clean and dry.    Do not soak your incision in water. No swimming or hot tubs until it has fully healed. You may soak in the bathtub if the water level is below your incision.    Do not use peroxide, gel, cream, lotion, or ointment on your incision.    Adjust your clothes to avoid pressure on your surgery site (check the elastic in your underwear for example).     You may see a small amount of clear or pink drainage and this is normal.  Check with your health care provider:       If the drainage increases or has an odor.    If the incision reddens, you have swelling, or develop a rash.    If you have increased pain and the medicine we prescribed doesn't help.    If you have a fever above 100.4 F (38 C) with or without chills when placing thermometer under your tongue.   The area around your incision (surgery wound), will feel numb.  This is normal. The numbness should go away in less than a year.     Keep your hands clean:  Always wash your hands before touching your incision (surgery wound). This helps reduce your risk of infection. If your hands aren't dirty, you may use an alcohol hand-rub to clean your hands. Keep your nails clean and short.    Call your healthcare provider if you have any of these symptoms:       You soak a sanitary pad with blood within 1 hour, or you see blood clots larger than  a golf ball.    Bleeding that lasts more than 6 weeks.    Vaginal discharge that smells bad.    Severe pain, cramping or tenderness in your lower belly area.    A need to urinate more frequently (use the toilet more often), more urgently (use the toilet very quickly), or it burns when you urinate.    Nausea and vomiting.    Redness, swelling or pain around a vein in your leg.    Problems breastfeeding or a red or painful area on your breast.    Chest pain and cough or are gasping for air.    Problems with coping with sadness, anxiety or depression. If you have concerns about hurting yourself or the baby, call your provider immediately.      You have questions or concerns after you return home.

## 2020-11-09 NOTE — PLAN OF CARE
Pt stable. Endocrine saw patient this morning and pt was told to stop Metformin and follow up with endocrine in 2-4 weeks.    Tanisha has been taking Ibuprofen and Tylenol for pain and feels this is working well. She has Tylenol, Ibuprofen and stool softeners at home to use after discharge. I reviewed recommended use and also reviewed her discharge medications (Micronor and Iron).    I reviewed discharge education and instructions with Tanisha and reviewed when she should call her clinic after discharge. She was given a Pump in Style pump to use at home.    Tanisha was discharged to home with her infant.

## 2020-11-09 NOTE — PLAN OF CARE
VSS. Postpartum assessment WDL. Serosanguinous drainage from incision. Interdry in fold at incision site. Pain managed with tylenol and ibuprofen. Blood glucose at 0200 was 65, pt drank apple juice and recheck BG is 90. Fasting blood glucose this am was 69. Pt had apple juice and recheck was 75. Pt denies symptoms of hypoglycemia after juice except for minor headache. BLE edema noted. Pt encouraged to ambulate and orally hydrate. Continue with plan of care.

## 2020-11-09 NOTE — PROGRESS NOTES
Post Partum Progress Note    Subjective:  She is resting comfortably in a chair this morning. Reports pain is tolerable with meds. Tolerating PO without nausea or vomiting. Lochia minimal. Voiding adequately. Passing gas. Ambulating without dizziness or difficulty. Breastfeeding without difficulty. Had a blood sugar of 65 last night and is worried that the metformin is too strong.     Objective:  Vitals:    20 0000 20 0828 20 1618 20 2330   BP: 104/61 103/61 112/66 113/76   Pulse: 64 64 58 63   Resp: 16 18 16 16   Temp: 98  F (36.7  C) 98.1  F (36.7  C) 98  F (36.7  C) 97.7  F (36.5  C)   TempSrc: Oral Oral Oral Oral   SpO2:         General: NAD. A&Ox3.  CV: Well perfused.  Pulm: Normal respiratory effort.  Abd: Soft, non-tender, non-distended. Fundus is firm and below the umbilicus.  Incision with steri strips in place, c/d/i.   Ext: Trace edema. No calf tenderness.    Labs/Imaging:   Glucose Values Latest Ref Rng & Units 2020   Bedside Glucose (mg/dl )  - -- -- -- -- -- -- --   GLUCOSE 70 - 99 mg/dL 84 118(H) 103(H) 102(H) 79 65(L) 90   Some recent data might be hidden       Assessment/Plan:  Tanisha Valenzuela is a 35 year old  female who is POD#3 s/p RLTCS. Pregnancy complicated by T2DM. Doing well postpartum.    #T2DM  - PTA levemir 16, novolog 11, metformin 1000 mg BID  - Per endo curbside: Metformin 1000 mg XL QD, mSSI  - BG wnl, not requiring insulin. One low blood glucose of 65 last night, will re-consult Endocrine today prior to discharge to see if any additional recs.       #Postpartum cares  - Encourage routine post-operative goals including ambulation and incentive spirometry  - PNC: Rh positive. Rubella immune. No intervention indicated.  - Pain: Controlled on oral medications  - Heme: Hgb 11.5> QBL 1294 (meth)> 9.6. Will discharge home with iron.  - GI: Continue anti-emetics and stool softeners  as needed.  - : Low UOP POD#1, resolved. Voiding.  - Infant: Stable in room  - Feeding: Plans on breastfeeding.  - BC: Plans on POPs    Discharge to home today after Endocrine consult.    July Cristina MD  Obstetrics and Gynecology, PGY-3  11/9/2020 6:10 AM

## 2020-11-09 NOTE — LACTATION NOTE
This note was copied from a baby's chart.  Consult for: Second baby, infant at 9% weight loss getting formula supplements per maternal request.    History:  Repeat  delivery @ 39w0d, AGA infant @ 8# 6 oz. birthweight, 5.7% loss at 24 hours and -8.9% from birthweight @ 48 hours of age with low intermediate risk serum bilirubin.  Maternal history of Type 2 DM managed with Metformin (insulin used at end of pregnancy) & metabolic syndrome with BMI 42, hyperlipidemia, PCOS, secondary female infertility, vitamin D deficiency, covid in July, postpartum hemorrhage with 1465 mL QBL.     Breast exam of mom: Soft, symmetric, wide spaced breasts with minimal breast tissue inferiorly, areola positioned close to bottom of breast bilaterally. Nipples are intact and everted bilaterally.  Tanisha Romano noted early tenderness & darker, larger areola but no breast growth during this pregnancy. She  her first baby with overabundant supply, ended up breastfeeding for 9 months for her daughter (now 15 y/o) and 5 months of that time also  her cousin's child, had plenty of milk for both. This was 16 years ago and before having PCOS, Type 2 DM, etc.     Feeding assessment:  Baby sleeping on arrival, left ascom number for mom to call with next feeding. Practiced hand expressing during visit and gave baby several drops transfer from spoon to mouth on finger.     Education provided: Discussed benefits of skin to skin and feeding on cue, potential risk factors and supply/demand with importance of frequent skin to skin, feedings & milk expression in early days to maximize supply, benefits of and how to do breast massage & hand expression, encouraged hands on pumping ideally 6 times daily for the first week then follow up with  outpatient. Reviewed how to tell if getting enough, breastfeeding log with when and who to call if concerns, [facility] pump cleaning handout and lactation resources for after discharge.    Plan:  Encouraged frequent skin to skin, breastfeed on cue with goal of 8 to 12 feedings in 24 hours. Continued hand expressing after feedings until milk is in and hands on pumping at least 5-6 times in 24 hours to help bring in full milk supply, feed back results. Follow up with outpatient lactation consultant within a week of discharge due to maternal risk factors for supply.

## 2020-11-09 NOTE — PLAN OF CARE
VSS, assessment WNL, pain managed with motrin and tylenol.  Borderline low blood sugars noted overnight, endocrine consulted with patient and is discontinuing metformin at this time.  Pt made aware.  Pt denies questions or concerns at this time.

## 2020-11-11 ENCOUNTER — TELEPHONE (OUTPATIENT)
Dept: OBGYN | Facility: CLINIC | Age: 35
End: 2020-11-11

## 2020-11-11 DIAGNOSIS — Z98.891 HISTORY OF CESAREAN DELIVERY: Primary | ICD-10-CM

## 2020-11-11 RX ORDER — IBUPROFEN 600 MG/1
600 TABLET, FILM COATED ORAL EVERY 6 HOURS PRN
Qty: 60 TABLET | Refills: 0 | Status: SHIPPED | OUTPATIENT
Start: 2020-11-11

## 2020-11-11 RX ORDER — ASPIRIN 81 MG
100 TABLET, DELAYED RELEASE (ENTERIC COATED) ORAL 2 TIMES DAILY PRN
Qty: 60 TABLET | Refills: 0 | Status: SHIPPED | OUTPATIENT
Start: 2020-11-11

## 2020-11-11 NOTE — TELEPHONE ENCOUNTER
Call from homecare nurse requesting prescriptions for ibuprofen and stool softener for patient so that they will be covered by [facility] [[email or web address]s insurance.    Sent per request.

## 2020-11-12 ENCOUNTER — TELEPHONE (OUTPATIENT)
Dept: ENDOCRINOLOGY | Facility: CLINIC | Age: 35
End: 2020-11-12

## 2020-11-17 ENCOUNTER — VIRTUAL VISIT (OUTPATIENT)
Dept: ENDOCRINOLOGY | Facility: CLINIC | Age: 35
End: 2020-11-17
Payer: COMMERCIAL

## 2020-11-17 DIAGNOSIS — O24.119 TYPE 2 DIABETES MELLITUS AFFECTING PREGNANCY, ANTEPARTUM: Primary | ICD-10-CM

## 2020-11-17 PROCEDURE — 99214 OFFICE O/P EST MOD 30 MIN: CPT | Mod: TEL | Performed by: PHYSICIAN ASSISTANT

## 2020-11-17 NOTE — LETTER
"2020       RE: Tanisha Valenzuela  [address] [facility] [address]     Dear Colleague,    Thank you for referring your patient, Tanisha Valenzuela, to the [facility] [place] [facility]. Please see a copy of my visit note below.    Tanisha Valenzuela is a 35 year old female who is being evaluated via a billable telephone visit.      The patient has been notified of following:     \"This telephone visit will be conducted via a call between you and your physician/provider. We have found that certain health care needs can be provided without the need for a physical exam.  This service lets us provide the care you need with a short phone conversation.  If a prescription is necessary we can send it directly to your pharmacy.  If lab work is needed we can place an order for that and you can then stop by our lab to have the test done at a later time.    Telephone visits are billed at different rates depending on your insurance coverage. During this emergency period, for some insurers they may be billed the same as an in-person visit.  Please reach out to your insurance provider with any questions.    If during the course of the call the physician/provider feels a telephone visit is not appropriate, you will not be charged for this service.\"    Patient has given verbal consent for Telephone visit?  Yes    What phone number would you like to be contacted at? [phone]    How would you like to obtain your AVS? [facility]    Phone call duration: 20 minutes    Due to the COVID 19 pandemic this visit was converted to a video in order to help prevent spread of infection in this high risk patient and the general population .          Tanisha Valenzuela is a 34 year old female who is being evaluated via a billable telephone visit.      Diabetes Virtual Visit   Hannah Patel  2020        Tanisha Romano is a 34 yo  F "  for follow-up of Type 2 diabetes following pregnancy.  She delivered a healthy baby girl via planned  on  .  She has been obese for many years and was diagnosed with type 2 diabetes in May 2016 after BMP revealed random BG of 242.  She had no known DM in previous pregnancy 16 years ago.      She was followed by Dr. Terrell Lopez and myself intermittently during pregnancy and her blood sugars were near goal on basal and bolus insulin.  Following delivery insulin was stopped.  Endocrinology was then consulted as she continued to have hypoglycemia on Metformin alone and that also was discontinued.    Tanisha tells me that she is doing very well at home home now.  She is breast-feeding and is going well.  Her mother is giving her a little bit of anxiety as she reported that she could pass diabetes to her daughter via the breastmilk.  She understood from her providers that this is not true but asked if there is any basis what so ever for what her mother is telling her.    She last checked BG Friday it was 90 and 115 after breakfast.  It was 118 2 hours after eating today and 88 fatsing.   She is being careful with her diet and eating .    Again she discusses that she did not gain much weight during her pregnancy and feels she has lost substantial weight since.  She is actually fitting into into smaller close than prior to pregnancy.  She is hopeful as her blood sugars are normal now and hopeful of putting DM into remission as we have previously discussed.        Current regimen:   No medications           Past Medical History  Past Medical History:   Diagnosis Date     BMI 37.0-37.9, adult      Hyperlipidemia      PCOS (polycystic ovarian syndrome)      Type 2 diabetes mellitus without complication, with long-term current use of insulin (H)      Pt reports positive COVID19 testing as of .  She reports minimal symptoms and now feeling well.      Allergies  No Known  Allergies  Medications  Current Outpatient Medications   Medication Sig Dispense Refill     ibuprofen (ADVIL/MOTRIN) 600 MG tablet Take 1 tablet (600 mg) by mouth every 6 hours as needed for moderate pain 60 tablet 0     norethindrone (MICRONOR) 0.35 MG tablet Take 1 tablet (0.35 mg) by mouth daily 90 tablet 1     Prenatal Vit-Fe Fumarate-FA (PRENATAL VITAMIN) 27-0.8 MG TABS Take 1 tablet by mouth daily 90 tablet 3     vitamin D3 (CHOLECALCIFEROL) 2000 units (50 mcg) tablet Take 2 tablets (4,000 Units) by mouth daily Take two tablets daily. 180 tablet 3     alcohol swab prep pads Use to swab area of injection/jose as directed. (Patient not taking: Reported on 11/17/2020) 100 each 3     aspirin 81 MG EC tablet Take 81 mg by mouth daily       blood glucose (NO BRAND SPECIFIED) lancets standard Use to test blood sugar 4 times daily or as directed. (Patient not taking: Reported on 11/17/2020) 200 each 3     blood glucose (NO BRAND SPECIFIED) test strip Use to test blood sugar 4 times daily or as directed. (Patient not taking: Reported on 11/17/2020) 200 strip 3     blood glucose (NO BRAND SPECIFIED) test strip Use to test blood sugar 4 times daily or as directed. (Patient not taking: Reported on 11/17/2020) 200 strip 3     blood glucose monitoring (NO BRAND SPECIFIED) meter device kit Use to test blood sugar 4 times daily or as directed. (Patient not taking: Reported on 11/17/2020) 1 kit 0     docusate sodium (COLACE) 100 MG tablet Take 1 tablet (100 mg) by mouth 2 times daily as needed for constipation 60 tablet 0     ferrous sulfate (FEROSUL) 325 (65 Fe) MG tablet Take 1 tablet (325 mg) by mouth daily (with breakfast) 90 tablet 0     insulin pen needle (ULTICARE MICRO) 32G X 4 MM miscellaneous Use 4  pen needles daily or as directed. (Patient not taking: Reported on 11/17/2020) 100 each 3     Family History  family history includes Diabetes in her maternal grandmother and mother.  Social History   to [place]  [place]; has one child, a 15 yo daughter.   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  ROS  Per HPI, otherwise review of symptoms was unremarkable      Physical Exam  LMP 02/01/2020   There is no height or weight on file to calculate BMI.    Vital signs and physical exam not available.  However the patient reports feeling well.  Psych: Alert and oriented times 3; coherent speech, normal   rate and volume, able to articulate logical thoughts, able   to abstract reason, no tangential thoughts, no hallucinations   or delusions    Wt Readings from Last 10 Encounters:   10/29/20 86.6 kg (191 lb)   10/22/20 86.6 kg (191 lb)   10/12/20 86.7 kg (191 lb 1.6 oz)   09/28/20 87 kg (191 lb 12.8 oz)   09/18/20 85.2 kg (187 lb 13.3 oz)   09/09/20 85.2 kg (187 lb 12.8 oz)   08/26/20 85.3 kg (188 lb)   07/10/20 83.5 kg (184 lb 1.6 oz)   05/01/20 83.1 kg (183 lb 3.2 oz)   03/30/20 84.1 kg (185 lb 4.8 oz)         RESULTSASSESSMENT:    Type 2 diabetes, currently in remission:    BG are at goal without therapy.     Plan:    Recommend 2 h OGTT 6-12 weeks postpartum.    Continue heart healthy diet and regular physical activity for life. Continue nursing; reinforced that multiple studies provide evidence that lactation reduces type 2 risk for her and baby.      Continue to check BG at least twice weekly fasting and post prandial.  If they rise to >110 fasting or 180 postprandial contact us or PCP to restart Metformin and discuss further monitoring.      vitamin D deficiency  Patient continues on vitamin D at 2000 IU daily.    positive COVID19 testing  Reports symptoms resolved.  Continue to follow.    Obesity: Very limited weight gain in pregnancy.  Continue to follow and encourage heart healthy diet and physical activity.  Consider resume Metformin when done lactating.  If needs diabetes therapy, advise consider GLP-1 agonist.       It is my privilege to be involved in the  care of the above patient.     Hannah Patel PA-C, MPAS  [facility]  Diabetes, Endocrinology, and Metabolism  [phone] Appointments/Nurse  [phone] Fax  [phone] pager  [phone] nurse line

## 2020-11-17 NOTE — PROGRESS NOTES
"Tanisha Valenzuela is a 35 year old female who is being evaluated via a billable telephone visit.      The patient has been notified of following:     \"This telephone visit will be conducted via a call between you and your physician/provider. We have found that certain health care needs can be provided without the need for a physical exam.  This service lets us provide the care you need with a short phone conversation.  If a prescription is necessary we can send it directly to your pharmacy.  If lab work is needed we can place an order for that and you can then stop by our lab to have the test done at a later time.    Telephone visits are billed at different rates depending on your insurance coverage. During this emergency period, for some insurers they may be billed the same as an in-person visit.  Please reach out to your insurance provider with any questions.    If during the course of the call the physician/provider feels a telephone visit is not appropriate, you will not be charged for this service.\"    Patient has given verbal consent for Telephone visit?  Yes    What phone number would you like to be contacted at? [phone]    How would you like to obtain your AVS? Wefunderhart    Phone call duration: 20 minutes    Due to the COVID 19 pandemic this visit was converted to a video in order to help prevent spread of infection in this high risk patient and the general population .          Tanisha Valenzuela is a 34 year old female who is being evaluated via a billable telephone visit.      Diabetes Virtual Visit   Hannah Patel  2020        Tanisha Romano is a 36 yo  F  for follow-up of Type 2 diabetes following pregnancy.  She delivered a healthy baby girl via planned  on  .  She has been obese for many years and was diagnosed with type 2 diabetes in May 2016 after BMP revealed random BG of 242.  She had no known DM in previous pregnancy 16 years ago.      She was " followed by Dr. Terrell Lopez and myself intermittently during pregnancy and her blood sugars were near goal on basal and bolus insulin.  Following delivery insulin was stopped.  Endocrinology was then consulted as she continued to have hypoglycemia on Metformin alone and that also was discontinued.    Tanisha tells me that she is doing very well at home home now.  She is breast-feeding and is going well.  Her mother is giving her a little bit of anxiety as she reported that she could pass diabetes to her daughter via the breastmilk.  She understood from her providers that this is not true but asked if there is any basis what so ever for what her mother is telling her.    She last checked BG Friday it was 90 and 115 after breakfast.  It was 118 2 hours after eating today and 88 fatsing.   She is being careful with her diet and eating .    Again she discusses that she did not gain much weight during her pregnancy and feels she has lost substantial weight since.  She is actually fitting into into smaller close than prior to pregnancy.  She is hopeful as her blood sugars are normal now and hopeful of putting DM into remission as we have previously discussed.        Current regimen:   No medications           Past Medical History  Past Medical History:   Diagnosis Date     BMI 37.0-37.9, adult      Hyperlipidemia      PCOS (polycystic ovarian syndrome)      Type 2 diabetes mellitus without complication, with long-term current use of insulin (H)      Pt reports positive COVID19 testing as of 7/14.  She reports minimal symptoms and now feeling well.      Allergies  No Known Allergies  Medications  Current Outpatient Medications   Medication Sig Dispense Refill     ibuprofen (ADVIL/MOTRIN) 600 MG tablet Take 1 tablet (600 mg) by mouth every 6 hours as needed for moderate pain 60 tablet 0     norethindrone (MICRONOR) 0.35 MG tablet Take 1 tablet (0.35 mg) by mouth daily 90 tablet 1     Prenatal Vit-Fe Fumarate-FA  (PRENATAL VITAMIN) 27-0.8 MG TABS Take 1 tablet by mouth daily 90 tablet 3     vitamin D3 (CHOLECALCIFEROL) 2000 units (50 mcg) tablet Take 2 tablets (4,000 Units) by mouth daily Take two tablets daily. 180 tablet 3     alcohol swab prep pads Use to swab area of injection/jose as directed. (Patient not taking: Reported on 11/17/2020) 100 each 3     aspirin 81 MG EC tablet Take 81 mg by mouth daily       blood glucose (NO BRAND SPECIFIED) lancets standard Use to test blood sugar 4 times daily or as directed. (Patient not taking: Reported on 11/17/2020) 200 each 3     blood glucose (NO BRAND SPECIFIED) test strip Use to test blood sugar 4 times daily or as directed. (Patient not taking: Reported on 11/17/2020) 200 strip 3     blood glucose (NO BRAND SPECIFIED) test strip Use to test blood sugar 4 times daily or as directed. (Patient not taking: Reported on 11/17/2020) 200 strip 3     blood glucose monitoring (NO BRAND SPECIFIED) meter device kit Use to test blood sugar 4 times daily or as directed. (Patient not taking: Reported on 11/17/2020) 1 kit 0     docusate sodium (COLACE) 100 MG tablet Take 1 tablet (100 mg) by mouth 2 times daily as needed for constipation 60 tablet 0     ferrous sulfate (FEROSUL) 325 (65 Fe) MG tablet Take 1 tablet (325 mg) by mouth daily (with breakfast) 90 tablet 0     insulin pen needle (ULTICARE MICRO) 32G X 4 MM miscellaneous Use 4  pen needles daily or as directed. (Patient not taking: Reported on 11/17/2020) 100 each 3     Family History  family history includes Diabetes in her maternal grandmother and mother.  Social History   to Zander Gil; has one child, a 15 yo daughter.   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  ROS  Per HPI, otherwise review of symptoms was unremarkable      Physical Exam  LMP 02/01/2020   There is no height or weight on file to calculate BMI.    Vital signs and physical exam not  available.  However the patient reports feeling well.  Psych: Alert and oriented times 3; coherent speech, normal   rate and volume, able to articulate logical thoughts, able   to abstract reason, no tangential thoughts, no hallucinations   or delusions    Wt Readings from Last 10 Encounters:   10/29/20 86.6 kg (191 lb)   10/22/20 86.6 kg (191 lb)   10/12/20 86.7 kg (191 lb 1.6 oz)   09/28/20 87 kg (191 lb 12.8 oz)   09/18/20 85.2 kg (187 lb 13.3 oz)   09/09/20 85.2 kg (187 lb 12.8 oz)   08/26/20 85.3 kg (188 lb)   07/10/20 83.5 kg (184 lb 1.6 oz)   05/01/20 83.1 kg (183 lb 3.2 oz)   03/30/20 84.1 kg (185 lb 4.8 oz)         RESULTSASSESSMENT:    Type 2 diabetes, currently in remission:    BG are at goal without therapy.     Plan:    Recommend 2 h OGTT 6-12 weeks postpartum.    Continue heart healthy diet and regular physical activity for life. Continue nursing; reinforced that multiple studies provide evidence that lactation reduces type 2 risk for her and baby.      Continue to check BG at least twice weekly fasting and post prandial.  If they rise to >110 fasting or 180 postprandial contact us or PCP to restart Metformin and discuss further monitoring.      vitamin D deficiency  Patient continues on vitamin D at 2000 IU daily.    positive COVID19 testing  Reports symptoms resolved.  Continue to follow.    Obesity: Very limited weight gain in pregnancy.  Continue to follow and encourage heart healthy diet and physical activity.  Consider resume Metformin when done lactating.  If needs diabetes therapy, advise consider GLP-1 agonist.       It is my privilege to be involved in the care of the above patient.     Hannah Ptael PA-C, MPAS  [facility]  Diabetes, Endocrinology, and Metabolism  [phone] Appointments/Nurse  [phone] Fax  [phone] pager  [phone] nurse line

## 2020-11-25 ENCOUNTER — OFFICE VISIT (OUTPATIENT)
Dept: OBGYN | Facility: CLINIC | Age: 35
End: 2020-11-25
Payer: COMMERCIAL

## 2020-11-25 VITALS
WEIGHT: 167.6 LBS | HEART RATE: 65 BPM | BODY MASS INDEX: 33.85 KG/M2 | DIASTOLIC BLOOD PRESSURE: 69 MMHG | SYSTOLIC BLOOD PRESSURE: 107 MMHG

## 2020-11-25 PROCEDURE — 99024 POSTOP FOLLOW-UP VISIT: CPT | Mod: GC | Performed by: OBSTETRICS & GYNECOLOGY

## 2020-11-25 PROCEDURE — G0463 HOSPITAL OUTPT CLINIC VISIT: HCPCS

## 2020-11-25 ASSESSMENT — ANXIETY QUESTIONNAIRES
6. BECOMING EASILY ANNOYED OR IRRITABLE: NOT AT ALL
7. FEELING AFRAID AS IF SOMETHING AWFUL MIGHT HAPPEN: NOT AT ALL
GAD7 TOTAL SCORE: 0
5. BEING SO RESTLESS THAT IT IS HARD TO SIT STILL: NOT AT ALL
2. NOT BEING ABLE TO STOP OR CONTROL WORRYING: NOT AT ALL
3. WORRYING TOO MUCH ABOUT DIFFERENT THINGS: NOT AT ALL
1. FEELING NERVOUS, ANXIOUS, OR ON EDGE: NOT AT ALL

## 2020-11-25 ASSESSMENT — PATIENT HEALTH QUESTIONNAIRE - PHQ9
SUM OF ALL RESPONSES TO PHQ QUESTIONS 1-9: 0
5. POOR APPETITE OR OVEREATING: NOT AT ALL

## 2020-11-25 ASSESSMENT — PAIN SCALES - GENERAL: PAINLEVEL: MILD PAIN (2)

## 2020-11-25 NOTE — PROGRESS NOTES
2 week Postpartum Visit Note    S:  Ms. Tanisha Valenzeula is a 35 year old  here for her 2-week postpartum checkup after repeat LTCS for failed TOLAC on 2020. Pregnancy complicated by T2DM and obesity.  No complications apparent.     Patient has been doing well in the post op period. Denies nausea, vomiting, pain, difficulty voiding, diarrhea, constipation. Her bleeding is minimal. Breastfeeding is going well.     She is followed by endocrinology. Her insulin has been stopped since delivery due to hypoglycemia. Her metformin has also been stopped since delivery due to hypoglycemia. She has not recently been taking blood sugars at home.     - Had a female on 2020 by repeat LTCS.  - Feeding Method:  Breastfeeding well  - Bleeding:  Light.    - Bowel/Urinary problems:  No    PHQ-9 score: 0    - Contraception Planned:  oral contraceptive  - She  has not had intercourse since delivery..      ================================================================  ROS: 10 point ROS neg other than the symptoms noted above in the HPI.     O:  /69 (BP Location: Left arm, Patient Position: Chair)   Pulse 65   Wt 76 kg (167 lb 9.6 oz)   LMP 2020   Breastfeeding Yes   BMI 33.85 kg/m    Gen: Well-appearing, NAD  Psych:  NEGATIVE  Abd:  Benign, Soft, flat, non-tender, No masses, organomegaly and Diastasis less than 1-2 FB  Inc:   well healed     A/P:  Ms. Tanisha Valenzuela is a 35 year old  here for 2 week postpartum visit after rLTCS. Doing very well.    - Instructed to start taking blood sugars more regularly to ensure she is not having high blood glucose.  - Feeding: Breastfeeding well. Continue breastfeeding as long as desired.  - Follow-up: Come back for 6 week postpartum visit.     Enzo Douglass MD  OB/GYN Resident, PGY-1  2020 1:01 PM      The Patient was seen in [facility] by ENZO DOUGLASS.  Patient was seen by me with the resident. Agree with above note.      Esme Mohan MD

## 2020-11-25 NOTE — NURSING NOTE
Chief Complaint   Patient presents with     Postpartum Care     2 wk pp check. Incision looks good.       See Evin, ANY 11/25/2020

## 2020-11-26 ASSESSMENT — ANXIETY QUESTIONNAIRES: GAD7 TOTAL SCORE: 0

## 2021-01-04 ENCOUNTER — HEALTH MAINTENANCE LETTER (OUTPATIENT)
Age: 36
End: 2021-01-04

## 2021-01-11 NOTE — PROGRESS NOTES
[facility]  Postpartum Visit    SUBJECTIVE  Tanisha HE Corona Valenzuela is a 35 year old  who is 9 weeks after a RTLCS delivery. Pregnancy was c/b T2DM and obesity. Vaginal bleeding continued for about 4 weeks after delivery, very light. Menses have not yet resumed. She is breastfeeding without difficulty. No bowel or urinary concerns. Mood is good. She made an appointment with her PCP to continue care for T2DM. She checked a FBG yesterday which was 93. Has not yet started contraceptives because she is not yet sexually active. She has a concern about the left corner of her incision, it is bothersome.    OB History    Para Term  AB Living   2 2 2 0 0 2   SAB TAB Ectopic Multiple Live Births   0 0 0 0 2      # Outcome Date GA Lbr Abdifatah/2nd Weight Sex Delivery Anes PTL Lv   2 Term 20 39w0d  3.8 kg (8 lb 6 oz) F CS-LTranv   CELESTE      Name: CORONA VALENZUELA,FEMALE-TANISHA      Apgar1: 8  Apgar5: 9   1 Term 04 41w0d  4.536 kg (10 lb) F CS-Classical EPI N CELESTE      Complications: Failure to Progress in Second Stage      Name: Tanisha Romano      Obstetric Comments   Patient reports that she is certain uterine incision is classical.  Denies DM with first pregnancy.  No HTN, needed blood transfusion after surgery.       Past Medical History:   Diagnosis Date     BMI 37.0-37.9, adult      Hyperlipidemia      PCOS (polycystic ovarian syndrome)      Type 2 diabetes mellitus without complication, with long-term current use of insulin (H)        Past Surgical History:   Procedure Laterality Date      SECTION        SECTION N/A 2020    Procedure:  SECTION;  Surgeon: Radha Trinidad MD;  Location:  L+D     CHOLECYSTECTOMY         Current Outpatient Medications   Medication     alcohol swab prep pads     aspirin 81 MG EC tablet     blood glucose (NO BRAND SPECIFIED) lancets standard     blood glucose (NO BRAND SPECIFIED) test strip     blood glucose (NO BRAND  "SPECIFIED) test strip     blood glucose monitoring (NO BRAND SPECIFIED) meter device kit     docusate sodium (COLACE) 100 MG tablet     ferrous sulfate (FEROSUL) 325 (65 Fe) MG tablet     ibuprofen (ADVIL/MOTRIN) 600 MG tablet     insulin pen needle (ULTICARE MICRO) 32G X 4 MM miscellaneous     norethindrone (MICRONOR) 0.35 MG tablet     Prenatal Vit-Fe Fumarate-FA (PRENATAL VITAMIN) 27-0.8 MG TABS     vitamin D3 (CHOLECALCIFEROL) 2000 units (50 mcg) tablet     No current facility-administered medications for this visit.         No Known Allergies    Family History   Problem Relation Age of Onset     Diabetes Mother      Diabetes Maternal Grandmother        REVIEW OF SYSTEMS  A 10 point review of systems including Constitutional, Eyes, Respiratory, Cardiovascular, Gastroenterology, Genitourinary, Integumentary, Musculoskeletal, and Psychiatric, were all negative, except for pertinent positives noted in the above HPI.    OBJECTIVE  /66   Pulse 66   Ht 1.499 m (4' 11\")   Wt 78 kg (172 lb)   LMP 2020   BMI 34.74 kg/m      General: Alert, without distress  HEENT: normocephalic, without obvious abnormality   Cardiovascular: extremities warm and well-perfused   Lungs: breathing comfortably at rest   Abdomen: soft, non-tender, non-distended, normal bowel sounds   Incision: clean/dry/intact, well-healed    ASSESSMENT/PLAN  Tanisha Valenzuela is a 35 year old , here for a 9 week postpartum visit.    -incision is well-healed. No further activity restrictions needed.  -pap last 2018 NIL//HPV neg - due 2023  -will followup with PCP for T2DM cares  -will start contraceptives when ready to resume sexual activity. Discussed recommended birth spacing.   -discussed management of vaginal dryness associated with BF    RTC 1 year/PRN.    Mary Espinal MD, MSCI    [facility]/OBGYN  "

## 2021-01-12 ENCOUNTER — OFFICE VISIT (OUTPATIENT)
Dept: OBGYN | Facility: CLINIC | Age: 36
End: 2021-01-12
Attending: OBSTETRICS & GYNECOLOGY
Payer: COMMERCIAL

## 2021-01-12 VITALS
HEART RATE: 66 BPM | DIASTOLIC BLOOD PRESSURE: 66 MMHG | SYSTOLIC BLOOD PRESSURE: 113 MMHG | BODY MASS INDEX: 34.68 KG/M2 | HEIGHT: 59 IN | WEIGHT: 172 LBS

## 2021-01-12 PROCEDURE — 99207 PR POST PARTUM EXAM: CPT | Performed by: OBSTETRICS & GYNECOLOGY

## 2021-01-12 ASSESSMENT — MIFFLIN-ST. JEOR: SCORE: 1385.82

## 2021-01-12 NOTE — LETTER
2021       RE: Tanisha Ontiveros  [address] [facility] [address]     Dear Colleague,    Thank you for referring your patient, Tanisha Ontiveros, to the [facility] [place] at [facility]. Please see a copy of my visit note below.    [facility]  Postpartum Visit    SUBJECTIVE  Tanisha Ontiveros is a 35 year old  who is 9 weeks after a RTLCS delivery. Pregnancy was c/b T2DM and obesity. Vaginal bleeding continued for about 4 weeks after delivery, very light. Menses have not yet resumed. She is breastfeeding without difficulty. No bowel or urinary concerns. Mood is good. She made an appointment with her PCP to continue care for T2DM. She checked a FBG yesterday which was 93. Has not yet started contraceptives because she is not yet sexually active. She has a concern about the left corner of her incision, it is bothersome.    OB History    Para Term  AB Living   2 2 2 0 0 2   SAB TAB Ectopic Multiple Live Births   0 0 0 0 2      # Outcome Date GA Lbr Abdifatah/2nd Weight Sex Delivery Anes PTL Lv   2 Term 20 39w0d  3.8 kg (8 lb 6 oz) F CS-LTranv   CELESTE      Name: KAITY ONTIVEROS,FEMALE-TANISHA      Apgar1: 8  Apgar5: 9   1 Term 04 41w0d  4.536 kg (10 lb) F CS-Classical EPI N CELESTE      Complications: Failure to Progress in Second Stage      Name: Tanisha Romano      Obstetric Comments   Patient reports that she is certain uterine incision is classical.  Denies DM with first pregnancy.  No HTN, needed blood transfusion after surgery.       Past Medical History:   Diagnosis Date     BMI 37.0-37.9, adult      Hyperlipidemia      PCOS (polycystic ovarian syndrome)      Type 2 diabetes mellitus without complication, with long-term current use of insulin (H)        Past Surgical History:   Procedure Laterality Date      SECTION        SECTION N/A 2020    Procedure:  SECTION;   "Surgeon: Radha Trinidad MD;  Location: [facility] L+[facility]     CHOLECYSTECTOMY         Current Outpatient Medications   Medication     alcohol swab prep pads     aspirin 81 MG EC tablet     blood glucose (NO BRAND SPECIFIED) lancets standard     blood glucose (NO BRAND SPECIFIED) test strip     blood glucose (NO BRAND SPECIFIED) test strip     blood glucose monitoring (NO BRAND SPECIFIED) meter device kit     docusate sodium (COLACE) 100 MG tablet     ferrous sulfate (FEROSUL) 325 (65 Fe) MG tablet     ibuprofen (ADVIL/MOTRIN) 600 MG tablet     insulin pen needle (ULTICARE MICRO) 32G X 4 MM miscellaneous     norethindrone (MICRONOR) 0.35 MG tablet     Prenatal Vit-Fe Fumarate-FA (PRENATAL VITAMIN) 27-0.8 MG TABS     vitamin D3 (CHOLECALCIFEROL) 2000 units (50 mcg) tablet     No current facility-administered medications for this visit.         No Known Allergies    Family History   Problem Relation Age of Onset     Diabetes Mother      Diabetes Maternal Grandmother        REVIEW OF SYSTEMS  A 10 point review of systems including Constitutional, Eyes, Respiratory, Cardiovascular, Gastroenterology, Genitourinary, Integumentary, Musculoskeletal, and Psychiatric, were all negative, except for pertinent positives noted in the above HPI.    OBJECTIVE  /66   Pulse 66   Ht 1.499 m (4' 11\")   Wt 78 kg (172 lb)   LMP 2020   BMI 34.74 kg/m      General: Alert, without distress  HEENT: normocephalic, without obvious abnormality   Cardiovascular: extremities warm and well-perfused   Lungs: breathing comfortably at rest   Abdomen: soft, non-tender, non-distended, normal bowel sounds   Incision: clean/dry/intact, well-healed    ASSESSMENT/PLAN  Tanisha Valenzuela is a 35 year old , here for a 9 week postpartum visit.    -incision is well-healed. No further activity restrictions needed.  -pap last 2018 NIL//HPV neg - due 2023  -will followup with PCP for T2DM cares  -will start contraceptives when ready " to resume sexual activity. Discussed recommended birth spacing.   -discussed management of vaginal dryness associated with BF    RTC 1 year/PRN.    Mary Espinal MD, MSCI    [facility]/OBGYN

## 2021-04-05 ENCOUNTER — IMMUNIZATION (OUTPATIENT)
Dept: NURSING | Facility: CLINIC | Age: 36
End: 2021-04-05
Payer: COMMERCIAL

## 2021-04-05 PROCEDURE — 91300 PR COVID VAC PFIZER DIL RECON 30 MCG/0.3 ML IM: CPT

## 2021-04-05 PROCEDURE — 0001A PR COVID VAC PFIZER DIL RECON 30 MCG/0.3 ML IM: CPT

## 2021-04-25 ENCOUNTER — HEALTH MAINTENANCE LETTER (OUTPATIENT)
Age: 36
End: 2021-04-25

## 2021-04-26 ENCOUNTER — IMMUNIZATION (OUTPATIENT)
Dept: NURSING | Facility: CLINIC | Age: 36
End: 2021-04-26
Attending: INTERNAL MEDICINE
Payer: COMMERCIAL

## 2021-04-26 PROCEDURE — 91300 PR COVID VAC PFIZER DIL RECON 30 MCG/0.3 ML IM: CPT

## 2021-04-26 PROCEDURE — 0002A PR COVID VAC PFIZER DIL RECON 30 MCG/0.3 ML IM: CPT

## 2021-08-15 ENCOUNTER — HEALTH MAINTENANCE LETTER (OUTPATIENT)
Age: 36
End: 2021-08-15

## 2021-10-10 ENCOUNTER — HEALTH MAINTENANCE LETTER (OUTPATIENT)
Age: 36
End: 2021-10-10

## 2021-12-05 ENCOUNTER — HEALTH MAINTENANCE LETTER (OUTPATIENT)
Age: 36
End: 2021-12-05

## 2022-01-30 ENCOUNTER — HEALTH MAINTENANCE LETTER (OUTPATIENT)
Age: 37
End: 2022-01-30

## 2022-03-27 ENCOUNTER — HEALTH MAINTENANCE LETTER (OUTPATIENT)
Age: 37
End: 2022-03-27

## 2022-07-17 ENCOUNTER — HEALTH MAINTENANCE LETTER (OUTPATIENT)
Age: 37
End: 2022-07-17

## 2022-09-10 NOTE — PROGRESS NOTES
Attempted to reach patient for scheduled telephone follow-up.  Patient did not answer.  Voicemail was left with contact number for patient to call and reschedule at her earliest convenience.  Radha Del Valle RD, LD, CDE  5/22/2020   3:54 PM    
Never smoker

## 2022-09-24 ENCOUNTER — HEALTH MAINTENANCE LETTER (OUTPATIENT)
Age: 37
End: 2022-09-24

## 2023-01-29 ENCOUNTER — HEALTH MAINTENANCE LETTER (OUTPATIENT)
Age: 38
End: 2023-01-29

## 2023-05-08 ENCOUNTER — HEALTH MAINTENANCE LETTER (OUTPATIENT)
Age: 38
End: 2023-05-08

## 2023-08-16 NOTE — PATIENT INSTRUCTIONS
My best wishes,    Hannah Patel PA-C, MPAS  [facility]  Diabetes, Endocrinology, and Metabolism  [phone] Appointments/Nurse  [phone] Fax  [phone] [place]  [phone] [place]  [phone] URGENTafter hours/weekend Endocrinologist on call      
Detail Level: Zone

## 2023-09-28 NOTE — LETTER
"2020       RE: Tanisha Valenzuela  [address] [facility] [address]     Dear Colleague,    Thank you for referring your patient, Tanisha Valenzuela, to the [facility] at [facility]. Please see a copy of my visit note below.    Doing well. Has BS values nearly all normal. Has plenty of supplies and insulin.   Desire TOLAC, discussed at last visit, but consent not signed.    /74   Pulse 75   Ht 1.499 m (4' 11.02\")   Wt 87 kg (191 lb 12.8 oz)   LMP 2020   BMI 38.71 kg/m    See flow    A/p: 36 yo  at 33+3 wks, PEDRO LUIS.      1. PNC. Utd.    2. DM 2.  Insulin per Dr. Lopez.  BS nearly all wnl.    2x/week BPPS and serial growth uls. Delivery planning as VIKAS gets closer. Likely plan IOL 38-39 wks.      3. Signed TOLAC consent today.     Mala Washburn MD, [facility] Specialists Staff  OB/GYN    2020  4:38 PM    " influenza, injectable, quadrivalent, preservative free; 21-Sep-2017 11:24; Gris Harp (RN); [employer]; [id number]; IntraMuscular; Deltoid Right.; 0.5 milliLiter(s); VIS (VIS Published: 07-Aug-2015, VIS Presented: 21-Sep-2017);   Tdap; 20-Aug-2020 19:27; Racheal Samuels (ELIZABETH); [employer]; [id number] (Exp. Date: 21-Nov-2021); IntraMuscular; Deltoid Right.; 0.5 milliLiter(s); VIS (VIS Published: 09-May-2013, VIS Presented: 20-Aug-2020);

## 2023-10-08 ENCOUNTER — HEALTH MAINTENANCE LETTER (OUTPATIENT)
Age: 38
End: 2023-10-08

## 2024-01-22 ENCOUNTER — TRANSCRIBE ORDERS (OUTPATIENT)
Dept: OTHER | Age: 39
End: 2024-01-22

## 2024-01-22 DIAGNOSIS — Z30.09 BIRTH CONTROL COUNSELING: Primary | ICD-10-CM

## 2024-01-29 DIAGNOSIS — Z30.430 ENCOUNTER FOR IUD INSERTION: Primary | ICD-10-CM

## 2024-02-25 ENCOUNTER — HEALTH MAINTENANCE LETTER (OUTPATIENT)
Age: 39
End: 2024-02-25

## (undated) DEVICE — SU VICRYL 3-0 SH 27" J316H

## (undated) DEVICE — SOL NACL 0.9% IRRIG 1000ML BOTTLE 07138-09

## (undated) DEVICE — SU MONOCRYL 4-0 PS-2 18" UND Y496G

## (undated) DEVICE — PACK C-SECTION LF PL15OTA83B

## (undated) DEVICE — PREP CHLORAPREP 26ML TINTED ORANGE  260815

## (undated) DEVICE — SUCTION CANISTER MEDIVAC LINER 1500ML W/LID 65651-515

## (undated) DEVICE — STRAP KNEE/BODY 31143004

## (undated) DEVICE — SOL WATER IRRIG 1000ML BOTTLE 07139-09

## (undated) DEVICE — BASIN SET MAJOR

## (undated) DEVICE — SU VICRYL 0 CT-1 36" J346H

## (undated) DEVICE — GLOVE PROTEXIS BLUE W/NEU-THERA 7.0  2D73EB70

## (undated) DEVICE — SU PLAIN 3-0 CTX 27" 873H

## (undated) DEVICE — CATH TRAY FOLEY SURESTEP 16FR WDRAIN BAG STLK LATEX A300316A

## (undated) DEVICE — STOCKING SLEEVE COMPRESSION CALF LG

## (undated) DEVICE — GLOVE ESTEEM POWDER FREE SMT 6.5  2D72PT65

## (undated) RX ORDER — MORPHINE SULFATE 1 MG/ML
INJECTION, SOLUTION EPIDURAL; INTRATHECAL; INTRAVENOUS
Status: DISPENSED
Start: 2020-11-06

## (undated) RX ORDER — FENTANYL CITRATE 50 UG/ML
INJECTION, SOLUTION INTRAMUSCULAR; INTRAVENOUS
Status: DISPENSED
Start: 2020-11-06